# Patient Record
Sex: MALE
[De-identification: names, ages, dates, MRNs, and addresses within clinical notes are randomized per-mention and may not be internally consistent; named-entity substitution may affect disease eponyms.]

---

## 2017-05-04 ENCOUNTER — RX ONLY (RX ONLY)
Age: 22
End: 2017-05-04

## 2020-11-17 ENCOUNTER — OFFICE VISIT (OUTPATIENT)
Dept: SURGERY | Age: 25
End: 2020-11-17
Payer: COMMERCIAL

## 2020-11-17 ENCOUNTER — PREP FOR PROCEDURE (OUTPATIENT)
Dept: SURGERY | Age: 25
End: 2020-11-17

## 2020-11-17 VITALS
OXYGEN SATURATION: 96 % | TEMPERATURE: 97.3 F | SYSTOLIC BLOOD PRESSURE: 108 MMHG | DIASTOLIC BLOOD PRESSURE: 70 MMHG | HEART RATE: 89 BPM | WEIGHT: 139 LBS | HEIGHT: 72 IN | BODY MASS INDEX: 18.83 KG/M2

## 2020-11-17 PROCEDURE — 46600 DIAGNOSTIC ANOSCOPY SPX: CPT | Performed by: SURGERY

## 2020-11-17 PROCEDURE — 99203 OFFICE O/P NEW LOW 30 MIN: CPT | Performed by: SURGERY

## 2020-11-17 RX ORDER — SODIUM CHLORIDE 0.9 % (FLUSH) 0.9 %
10 SYRINGE (ML) INJECTION PRN
Status: CANCELLED | OUTPATIENT
Start: 2020-11-17

## 2020-11-17 RX ORDER — SODIUM CHLORIDE 0.9 % (FLUSH) 0.9 %
10 SYRINGE (ML) INJECTION EVERY 12 HOURS SCHEDULED
Status: CANCELLED | OUTPATIENT
Start: 2020-11-17

## 2020-11-17 RX ORDER — M-VIT,TX,IRON,MINS/CALC/FOLIC 27MG-0.4MG
1 TABLET ORAL DAILY
COMMUNITY
End: 2020-12-14

## 2020-11-17 SDOH — HEALTH STABILITY: MENTAL HEALTH: HOW OFTEN DO YOU HAVE A DRINK CONTAINING ALCOHOL?: NEVER

## 2020-11-17 NOTE — LETTER
P - [facility] [address] [place] [address] Road [phone]  f [phone]    Yadira Condon MD                        SURGERY ORDER   -- Time of order -- 20    9:59 AM    Facility:   [facility]. # _________________                                                                                    Scheduled By:____________                  Surgery Date & Time: 12-15-20 @7:15                                      Pt arrival: 5:30                                                                                      Patient Name:  Moses Enrique     :  1995     PCP:  No primary care provider on file. Home Ph:    [phone] (home)                                                     PROCEDURE:  Exam under anesthesia,  botox injection anal fissure, fissurectomy, flexible sigmoidoscopy   29283, 27246, 93385, 24260      DIAGNOSIS:      ICD-10-CM    1.  Anal fissure  K60.2        Anesthesia: _General    Anesthesia: lines, blocks, TAP/epidural, etc: none  Time Needed:  15 minutes    Pt Position:  prone/alcides-knife    Bowel Prep: flemaverick         Outpatient _x__ Admit ___                  Pre-Op to be done by: _N/A____    Cardiac Clearance Done by: ________    Medications to be stopped 5 days before surgery: _________    Additional / Special Orders:                                                                                                                                                                                                          Yadira Condon MD

## 2020-11-17 NOTE — PATIENT INSTRUCTIONS
Anal Fissure: Information and Care Instructions        An anal fissure is a tear in the lining of the anus. It typically causes intense, sharp pain and a small amount of bleeding. You may notice bright red blood on the toilet paper after you wipe. A fissure may form if you are constipated and try to pass a large, hard stool, or have excessive diarrhea. Some fissures form despite regular, soft stools. Some are due to trauma. Rarely, fissures can be a sign of other diseases such as Crohn's disease, infections, or cancer. In 50% of patients, anal fissure will heal by addressing the underlying problem and avoiding additional hard stool and constipation. See below for recommendations. In the other 50% of patients, anal fissures are resistant to healing due to spasm (abnormal tightening) of the internal sphincter muscle. This part of the anal muscles is under automatic control, so you may not feel the spasm. Most treatments attempt to break the cycle of spasm and pain by relaxing the internal sphincter muscle. This can be done with medication, Botox injection, and occasionally with surgery. Anal fissures themselves do not lead to cancer or other serious illnesses, however undiagnosed rectal bleeding can be a sign of other problems in the colon and rectum, such as hemorrhoids, infections, polyps, or even cancers. If bleeding is excessive, mixed with stool, or ongoing after healing of the fissure, or you have a family history of cancer, colonoscopy may be recommended. How to treat constipation and avoid straining:  · Avoid constipation:  ¨ Include fruits, vegetables, beans, and whole grains in your diet each day. These foods are high in fiber. ¨ Take a fiber supplement, such as Benefiber, Citrucel, or Metamucil, every day. Read and follow all instructions on the label. ¨ Drink plenty of fluids, enough so that your urine is light yellow or clear like water.  If you have kidney, heart, or liver disease and have to limit fluids, talk with your doctor before you increase the amount of fluids you drink. ¨ Miralax or colace can be helpful to take as needed for constipation. Read and follow all instructions on the label. ¨ Use the toilet when only when you feel the urge. Do not strain when having a bowel movement. Do not sit on the toilet too long, play games on your cell phone, or read while on the commode. ¨ Get some exercise every day. Build up slowly to 30 to 60 minutes a day on 5 or more days of the week. · Support your feet with a small step stool when you sit on the toilet. This helps flex your hips and places your pelvis in a squatting position. · Most over-the-counter ointments and creams are not helpful, and can even cause damage to the skin  · Use baby wipes instead of toilet paper to clean after a bowel movement. These products do not irritate the anus. · Sit in a few inches of warm water (sitz bath) 3 times a day and after bowel movements. The warm water helps ease discomfort. Do not put soaps, salts, or shampoos in the water. Be sure to follow up in the office as instructed  · Typically you will have a follow up appointment scheduled in 4-6 weeks to reassess your symptoms. If not, please call the office to schedule this. · You may need to be seen sooner if you have fever, chills, excessive bleeding, increasing pain, inability to urinate, or changes in appetite. · Please call the office at [phone] with any questions or concerns  · If it is outside of normal hours and you have any concerns, please go to your nearest emergency room. What other options are available to treat fissures if I continue to have symptoms:  · Special ointments can be prescribed to help relax the muscle spasm. Typically, these need to be compounded (mixed) at a special pharmacy. A pea-sized amount should be used around (not inside) the anus twice daily.   · Botox injections of the sphincter can be performed, which will provide spasm relief for 1-2 months. Occasionally this needs to be repeated. This is typically performed as a same day surgery with anesthesia/sedation. · Internal sphincterotomy is a surgery in which a portion of the internal sphincter muscle is cut, to relieve the spasm. This procedure has a high success rate, but a higher risk of complications, including rarely a loss of bowel control (incontinence). This is typically performed as a same day surgery with anesthesia/sedation. · Fissurectomy and dermal advancement flap is a surgery in which healthy skin flaps are brought in from around the anus to cover the fissure and promote healing. This surgery is a bit more complex and sometimes require an overnight stay in the hospital.  · The decision of which treatment is right for you depends on the chronicity and severity of your symptoms, age, gender, previous anorectal and other surgeries, underlying bowel control issues, and any suspicion for cancer or other diseases. · Colonoscopy may be recommended at some point in your care if you have not had one recently or bleeding continues after the fissure heals    · Please talk to your colorectal surgeon about any health conditions or concerns you may have regarding your anal fissure treatment.

## 2020-11-24 ENCOUNTER — TELEPHONE (OUTPATIENT)
Dept: SURGERY | Age: 25
End: 2020-11-24

## 2020-12-08 NOTE — PROGRESS NOTES
[facility] [employer] / [facility]) [address] [facility], [address]    Acknowledgment of Informed Consent for Surgical or Medical Procedure and Sedation  I agree to allow doctor(s) Theodore Hope and his/her associates or assistants, including residents and/or other qualified medical practitioner to perform the following medical treatment or procedure and to administer or direct the administration of sedation as necessary:  Procedure(s): EXAM UNDER ANESTHESIA, BOTOX INJECTION ANAL FISSURE, FISSURECTOMY, FLEXIBLE SIGMOIDOSCOPY  My doctor has explained the following regarding the proposed procedure:   the explanation of the procedure   the benefits of the procedure   the potential problems that might occur during recuperation   the risks and side effects of the procedure which could include but are not limited to severe blood loss, infection, stroke or death   the benefits, risks and side effect of alternative procedures including the consequences of declining this procedure or any alternative procedures   the likelihood of achieving satisfactory results. I acknowledge no guarantee or assurance has been made to me regarding the results. I understand that during the course of this treatment/procedure, unforeseen conditions can occur which require an additional or different procedure. I agree to allow my physician or assistants to perform such extension of the original procedure as they may find necessary. I understand that sedation will often result in temporary impairment of memory and fine motor skills and that sedation can occasionally progress to a state of deep sedation or general anesthesia. I understand the risks of anesthesia for surgery include, but are not limited to, sore throat, hoarseness, injury to face, mouth, or teeth; nausea; headache; injury to blood vessels or nerves; death, brain damage, or paralysis.     I understand that if I have a Limitation of Treatment order in effect during my hospitalization, the order may or may not be in effect during this procedure. I give my doctor permission to give me blood or blood products. I understand that there are risks with receiving blood such as hepatitis, AIDS, fever, or allergic reaction. I acknowledge that the risks, benefits, and alternatives of this treatment have been explained to me and that no express or implied warranty has been given by the hospital, any blood bank, or any person or entity as to the blood or blood components transfused. At the discretion of my doctor, I agree to allow observers, equipment/product representatives and allow photographing, and/or televising of the procedure, provided my name or identity is maintained confidentially. I agree the hospital may dispose of or use for scientific or educational purposes any tissue, fluid, or body parts which may be removed.     ________________________________Date________Time______ am/pm  ([facility])  Patient or Signature of Closest Relative or Legal Guardian    ________________________________Date________Time______am/pm      Page 1 of  1  Witness

## 2020-12-09 ENCOUNTER — OFFICE VISIT (OUTPATIENT)
Dept: PRIMARY CARE CLINIC | Age: 25
End: 2020-12-09
Payer: COMMERCIAL

## 2020-12-09 PROCEDURE — 99211 OFF/OP EST MAY X REQ PHY/QHP: CPT | Performed by: NURSE PRACTITIONER

## 2020-12-10 LAB — SARS-COV-2: NOT DETECTED

## 2020-12-14 ENCOUNTER — ANESTHESIA EVENT (OUTPATIENT)
Dept: OPERATING ROOM | Age: 25
End: 2020-12-14
Payer: COMMERCIAL

## 2020-12-14 ENCOUNTER — TELEPHONE (OUTPATIENT)
Dept: SURGERY | Age: 25
End: 2020-12-14

## 2020-12-14 NOTE — PROGRESS NOTES
Discharge instructions:  Patient plans to be discharged day of surgery to home with his mother Theodis Mcburney.

## 2020-12-14 NOTE — PROGRESS NOTES
[facility] patients having surgery or anesthesia are required to be Covid tested. You will need to quarantined from the time you are tested until your surgery. PRIOR TO PROCEDURE DATE:  1. Please follow any guidelines/instructions prior to your procedure as advised by your surgeon. 2. Arrange for someone to drive you home and be with you for the first 24 hours after discharge for your safety after your procedure for which you received sedation. Ensure it is someone we can share information with regarding your discharge. 3. You must contact your surgeon for instructions IF:   You are taking any blood thinners, aspirin, anti-inflammatory or vitamin E.   There is a change in your physical condition such as a cold, fever, rash, cuts, sores or any other infection, especially near your surgical site. 4. Do not drink alcohol the day before or day of your procedure. 5. A Pre-op History and Physical for surgery MUST be completed by your Physician or Urgent Care within 30 days of your procedure date. Please bring a copy with you on the day of your procedure and along with any other testing performed. THE DAY OF YOUR PROCEDURE:  1. Follow instructions for ARRIVAL TIME as DIRECTED BY YOUR SURGEON. I    2. Enter the MAIN entrance from [employer] and follow the signs to the free [employer] or [employer] parking (offered free of charge 6am-5pm). 3. Enter the Main Entrance of the hospital (do not enter from the lower level of the parking garage). Upon entrance, check in with the  at the main desk on your left. If no one is available at the desk, proceed into the [facility] Waiting Room and go through the door directly into the [facility]. There is a Check-in desk ACROSS from Room 5 (marked with a sign hanging from the ceiling). The phone number for the surgery center is [phone].     4. Please call [phone] option #2 option #2 if you have not been preregistered yet. On the day of your procedure bring your insurance card and photo ID. You will be registered at your bedside once brought back to your room. 5. DO NOT EAT ANYTHING eight hours prior to surgery. May have 8 ounces of water 4 hours prior to surgery. 6. MEDICATIONS    Take the following medications with a SMALL sip of water: none   Use your usual dose of inhalers the morning of surgery. BRING your rescue inhaler with you to hospital.    Anesthesia does NOT want you to take insulin the morning of surgery. They will control your blood sugar while you are at the hospital. Please contact your ordering physician for instructions regarding your insulin the night before your procedure. If you have an insulin pump, please keep it set on basal rate. 7. Do not swallow water when brushing teeth. No gum, candy, mints or ice chips. Refrain from smoking or at least decrease the amount. 8. Dress in loose, comfortable clothing appropriate for redressing after your procedure. Do not wear jewelry (including body piercings), make-up (especially NO eye make-up), fingernail polish (NO toenail polish if foot/leg surgery), lotion, powders or metal hairclips. 9. Dentures, glasses, or contacts will need to be removed before your procedure. Bring cases for your glasses, contacts, dentures, or hearing aids to protect them while you are in surgery. 10. If you use a CPAP, please bring it with you on the day of your procedure. 11. We recommend that valuable personal  belongings such as cash, cell phones, e-tablets or jewelry, be left at home during your stay. The hospital will not be responsible for valuables that are not secured in the hospital safe. However, if your insurance requires a co-pay, you may want to bring a method of payment, i.e. Check or credit card, if you wish to pay your co-pay the day of surgery.       12. If you are to stay overnight, you may bring a bag with personal items. Please have any large items you may need brought in by your family after your arrival to your hospital room. 15. If you have a Living Will or Durable Power of , please bring a copy on the day of your procedure. 15. With your permission, one family member may accompany you while you are being prepared for surgery. Once you are ready, additional family members may join you. HOW WE KEEP YOU SAFE and WORK TO PREVENT SURGICAL SITE INFECTIONS:  1. Health care workers should always check your ID bracelet to verify your name and birth date. You will be asked many times to state your name, date of birth, and allergies. 2. Health care workers should always clean their hands with soap or alcohol gel before providing care to you. It is okay to ask anyone if they cleaned their hands before they touch you. 3. You will be actively involved in verifying the type of procedure you are having and ensuring the correct surgical site. This will be confirmed multiple times prior to your procedure. Do NOT keena your surgery site UNLESS instructed to by your surgeon. 4. Do not shave or wax for 72 hours prior to procedure near your operative site. Shaving with a razor can irritate your skin and make it easier to develop an infection. On the day of your procedure, any hair that needs to be removed near the surgical site will be clipped by a healthcare worker using a special clippers designed to avoid skin irritation. 5. When you are in the operating room, your surgical site will be cleansed with a special soap, and in most cases, you will be given an antibiotic before the surgery begins. What to expect AFTER YOUR PROCEDURE:  1. Immediately following your procedure, your will be taken to the PACU for the first phase of your recovery.   Your nurse will help you recover from any potential side effects of anesthesia, such as extreme drowsiness, changes in your vital signs or breathing patterns. Nausea, headache, muscle aches, or sore throat may also occur after anesthesia. Your nurse will help you manage these potential side effects. 2. For comfort and safety, arrange to have someone at home with you for the first 24 hours after discharge. 3. You and your family will be given written instructions about your diet, activity, dressing care, medications, and return visits. 4. Once at home, should issues with nausea, pain, or bleeding occur, or should you notice any signs of infection, you should call your surgeon. 5. Always clean your hands before and after caring for your wound. Do not let your family touch your surgery site without cleaning their hands. 6. Narcotic pain medications can cause significant constipation. You may want to add a stool softener to your postoperative medication schedule or speak to your surgeon on how best to manage this SIDE EFFECT. SPECIAL INSTRUCTIONS advised to check with Dr. Lauren Andrews office to clarify arrival time. Informed patient of restricted visitation that is in place as of today. Patient acknowledges understanding of pre op instructions. Thank you for allowing us to care for you. We strive to exceed your expectations in the delivery of care and service provided to you and your family. If you need to contact us for any reason, please call us at [phone]    Instructions reviewed with patient during preadmission testing phone interview. Imelda Moore. 12/14/2020 .11:39 AM      ADDITIONAL EDUCATIONAL INFORMATION REVIEWED PER PHONE WITH YOU AND/OR YOUR FAMILY:  No Bring a urine sample on day of surgery  No Hibiclens® Bathing Instructions   Yes Antibacterial Soap

## 2020-12-15 ENCOUNTER — ANESTHESIA (OUTPATIENT)
Dept: OPERATING ROOM | Age: 25
End: 2020-12-15
Payer: COMMERCIAL

## 2020-12-15 ENCOUNTER — HOSPITAL ENCOUNTER (OUTPATIENT)
Age: 25
Setting detail: OUTPATIENT SURGERY
Discharge: HOME | End: 2020-12-15
Attending: SURGERY | Admitting: SURGERY
Payer: COMMERCIAL

## 2020-12-15 VITALS
BODY MASS INDEX: 18.96 KG/M2 | HEART RATE: 67 BPM | SYSTOLIC BLOOD PRESSURE: 121 MMHG | RESPIRATION RATE: 16 BRPM | HEIGHT: 72 IN | OXYGEN SATURATION: 97 % | TEMPERATURE: 98 F | WEIGHT: 140 LBS | DIASTOLIC BLOOD PRESSURE: 79 MMHG

## 2020-12-15 VITALS — SYSTOLIC BLOOD PRESSURE: 106 MMHG | DIASTOLIC BLOOD PRESSURE: 61 MMHG | OXYGEN SATURATION: 90 % | TEMPERATURE: 96.1 F

## 2020-12-15 PROCEDURE — 88304 TISSUE EXAM BY PATHOLOGIST: CPT

## 2020-12-15 PROCEDURE — 3700000000 HC ANESTHESIA ATTENDED CARE: Performed by: SURGERY

## 2020-12-15 PROCEDURE — 7100000010 HC PHASE II RECOVERY - FIRST 15 MIN: Performed by: SURGERY

## 2020-12-15 PROCEDURE — 3600000013 HC SURGERY LEVEL 3 ADDTL 15MIN: Performed by: SURGERY

## 2020-12-15 PROCEDURE — 2709999900 HC NON-CHARGEABLE SUPPLY: Performed by: SURGERY

## 2020-12-15 PROCEDURE — 2580000003 HC RX 258: Performed by: ANESTHESIOLOGY

## 2020-12-15 PROCEDURE — C9290 INJ, BUPIVACAINE LIPOSOME: HCPCS | Performed by: SURGERY

## 2020-12-15 PROCEDURE — 3700000001 HC ADD 15 MINUTES (ANESTHESIA): Performed by: SURGERY

## 2020-12-15 PROCEDURE — 46505 CHEMODENERVATION ANAL MUSC: CPT | Performed by: SURGERY

## 2020-12-15 PROCEDURE — 45330 DIAGNOSTIC SIGMOIDOSCOPY: CPT | Performed by: SURGERY

## 2020-12-15 PROCEDURE — 2580000003 HC RX 258: Performed by: SURGERY

## 2020-12-15 PROCEDURE — 2500000003 HC RX 250 WO HCPCS: Performed by: NURSE ANESTHETIST, CERTIFIED REGISTERED

## 2020-12-15 PROCEDURE — 7100000001 HC PACU RECOVERY - ADDTL 15 MIN: Performed by: SURGERY

## 2020-12-15 PROCEDURE — 3600000003 HC SURGERY LEVEL 3 BASE: Performed by: SURGERY

## 2020-12-15 PROCEDURE — 6360000002 HC RX W HCPCS: Performed by: NURSE ANESTHETIST, CERTIFIED REGISTERED

## 2020-12-15 PROCEDURE — 6360000002 HC RX W HCPCS: Performed by: SURGERY

## 2020-12-15 PROCEDURE — 46200 REMOVAL OF ANAL FISSURE: CPT | Performed by: SURGERY

## 2020-12-15 PROCEDURE — 7100000000 HC PACU RECOVERY - FIRST 15 MIN: Performed by: SURGERY

## 2020-12-15 PROCEDURE — 7100000011 HC PHASE II RECOVERY - ADDTL 15 MIN: Performed by: SURGERY

## 2020-12-15 RX ORDER — LABETALOL 20 MG/4 ML (5 MG/ML) INTRAVENOUS SYRINGE
5 EVERY 10 MIN PRN
Status: DISCONTINUED | OUTPATIENT
Start: 2020-12-15 | End: 2020-12-15 | Stop reason: HOSPADM

## 2020-12-15 RX ORDER — SODIUM CHLORIDE 0.9 % (FLUSH) 0.9 %
10 SYRINGE (ML) INJECTION EVERY 12 HOURS SCHEDULED
Status: DISCONTINUED | OUTPATIENT
Start: 2020-12-15 | End: 2020-12-15 | Stop reason: HOSPADM

## 2020-12-15 RX ORDER — ONDANSETRON 2 MG/ML
INJECTION INTRAMUSCULAR; INTRAVENOUS PRN
Status: DISCONTINUED | OUTPATIENT
Start: 2020-12-15 | End: 2020-12-15 | Stop reason: SDUPTHER

## 2020-12-15 RX ORDER — PROMETHAZINE HYDROCHLORIDE 25 MG/ML
6.25 INJECTION, SOLUTION INTRAMUSCULAR; INTRAVENOUS
Status: DISCONTINUED | OUTPATIENT
Start: 2020-12-15 | End: 2020-12-15 | Stop reason: HOSPADM

## 2020-12-15 RX ORDER — HYDRALAZINE HYDROCHLORIDE 20 MG/ML
5 INJECTION INTRAMUSCULAR; INTRAVENOUS EVERY 10 MIN PRN
Status: DISCONTINUED | OUTPATIENT
Start: 2020-12-15 | End: 2020-12-15 | Stop reason: HOSPADM

## 2020-12-15 RX ORDER — PROPOFOL 10 MG/ML
INJECTION, EMULSION INTRAVENOUS PRN
Status: DISCONTINUED | OUTPATIENT
Start: 2020-12-15 | End: 2020-12-15 | Stop reason: SDUPTHER

## 2020-12-15 RX ORDER — MIDAZOLAM HYDROCHLORIDE 1 MG/ML
INJECTION INTRAMUSCULAR; INTRAVENOUS PRN
Status: DISCONTINUED | OUTPATIENT
Start: 2020-12-15 | End: 2020-12-15 | Stop reason: SDUPTHER

## 2020-12-15 RX ORDER — ONDANSETRON 2 MG/ML
4 INJECTION INTRAMUSCULAR; INTRAVENOUS
Status: DISCONTINUED | OUTPATIENT
Start: 2020-12-15 | End: 2020-12-15 | Stop reason: HOSPADM

## 2020-12-15 RX ORDER — SODIUM CHLORIDE 9 MG/ML
INJECTION, SOLUTION INTRAVENOUS CONTINUOUS
Status: DISCONTINUED | OUTPATIENT
Start: 2020-12-15 | End: 2020-12-15 | Stop reason: HOSPADM

## 2020-12-15 RX ORDER — OXYCODONE HYDROCHLORIDE 5 MG/1
5 TABLET ORAL EVERY 6 HOURS PRN
Qty: 20 TABLET | Refills: 0 | Status: SHIPPED | OUTPATIENT
Start: 2020-12-15 | End: 2020-12-20

## 2020-12-15 RX ORDER — SODIUM CHLORIDE 0.9 % (FLUSH) 0.9 %
10 SYRINGE (ML) INJECTION PRN
Status: DISCONTINUED | OUTPATIENT
Start: 2020-12-15 | End: 2020-12-15 | Stop reason: HOSPADM

## 2020-12-15 RX ORDER — ROCURONIUM BROMIDE 10 MG/ML
INJECTION, SOLUTION INTRAVENOUS PRN
Status: DISCONTINUED | OUTPATIENT
Start: 2020-12-15 | End: 2020-12-15 | Stop reason: SDUPTHER

## 2020-12-15 RX ORDER — SODIUM CHLORIDE, SODIUM LACTATE, POTASSIUM CHLORIDE, CALCIUM CHLORIDE 600; 310; 30; 20 MG/100ML; MG/100ML; MG/100ML; MG/100ML
INJECTION, SOLUTION INTRAVENOUS CONTINUOUS
Status: DISCONTINUED | OUTPATIENT
Start: 2020-12-15 | End: 2020-12-15 | Stop reason: HOSPADM

## 2020-12-15 RX ORDER — MAGNESIUM HYDROXIDE 1200 MG/15ML
LIQUID ORAL CONTINUOUS PRN
Status: COMPLETED | OUTPATIENT
Start: 2020-12-15 | End: 2020-12-15

## 2020-12-15 RX ORDER — MEPERIDINE HYDROCHLORIDE 25 MG/ML
12.5 INJECTION INTRAMUSCULAR; INTRAVENOUS; SUBCUTANEOUS EVERY 5 MIN PRN
Status: DISCONTINUED | OUTPATIENT
Start: 2020-12-15 | End: 2020-12-15 | Stop reason: HOSPADM

## 2020-12-15 RX ORDER — LIDOCAINE HYDROCHLORIDE 20 MG/ML
INJECTION, SOLUTION INFILTRATION; PERINEURAL PRN
Status: DISCONTINUED | OUTPATIENT
Start: 2020-12-15 | End: 2020-12-15 | Stop reason: SDUPTHER

## 2020-12-15 RX ORDER — FENTANYL CITRATE 50 UG/ML
INJECTION, SOLUTION INTRAMUSCULAR; INTRAVENOUS PRN
Status: DISCONTINUED | OUTPATIENT
Start: 2020-12-15 | End: 2020-12-15 | Stop reason: SDUPTHER

## 2020-12-15 RX ORDER — FENTANYL CITRATE 50 UG/ML
50 INJECTION, SOLUTION INTRAMUSCULAR; INTRAVENOUS EVERY 5 MIN PRN
Status: DISCONTINUED | OUTPATIENT
Start: 2020-12-15 | End: 2020-12-15 | Stop reason: HOSPADM

## 2020-12-15 RX ORDER — FENTANYL CITRATE 50 UG/ML
25 INJECTION, SOLUTION INTRAMUSCULAR; INTRAVENOUS EVERY 5 MIN PRN
Status: DISCONTINUED | OUTPATIENT
Start: 2020-12-15 | End: 2020-12-15 | Stop reason: HOSPADM

## 2020-12-15 RX ORDER — OXYCODONE HYDROCHLORIDE AND ACETAMINOPHEN 5; 325 MG/1; MG/1
1 TABLET ORAL
Status: DISCONTINUED | OUTPATIENT
Start: 2020-12-15 | End: 2020-12-15 | Stop reason: HOSPADM

## 2020-12-15 RX ADMIN — SUGAMMADEX 200 MG: 100 INJECTION, SOLUTION INTRAVENOUS at 07:49

## 2020-12-15 RX ADMIN — PROPOFOL 200 MG: 10 INJECTION, EMULSION INTRAVENOUS at 07:17

## 2020-12-15 RX ADMIN — ONDANSETRON 2 MG: 2 INJECTION INTRAMUSCULAR; INTRAVENOUS at 07:17

## 2020-12-15 RX ADMIN — LIDOCAINE HYDROCHLORIDE 100 MG: 20 INJECTION, SOLUTION INFILTRATION; PERINEURAL at 07:17

## 2020-12-15 RX ADMIN — SODIUM CHLORIDE, POTASSIUM CHLORIDE, SODIUM LACTATE AND CALCIUM CHLORIDE: 600; 310; 30; 20 INJECTION, SOLUTION INTRAVENOUS at 07:10

## 2020-12-15 RX ADMIN — MIDAZOLAM HYDROCHLORIDE 2 MG: 2 INJECTION, SOLUTION INTRAMUSCULAR; INTRAVENOUS at 07:12

## 2020-12-15 RX ADMIN — ROCURONIUM BROMIDE 50 MG: 10 INJECTION INTRAVENOUS at 07:17

## 2020-12-15 RX ADMIN — FENTANYL CITRATE 100 MCG: 50 INJECTION INTRAMUSCULAR; INTRAVENOUS at 07:17

## 2020-12-15 ASSESSMENT — PULMONARY FUNCTION TESTS
PIF_VALUE: 15
PIF_VALUE: 12
PIF_VALUE: 17
PIF_VALUE: 0
PIF_VALUE: 30
PIF_VALUE: 31
PIF_VALUE: 17
PIF_VALUE: 1
PIF_VALUE: 16
PIF_VALUE: 17
PIF_VALUE: 17
PIF_VALUE: 0
PIF_VALUE: 16
PIF_VALUE: 15
PIF_VALUE: 6
PIF_VALUE: 17
PIF_VALUE: 13
PIF_VALUE: 16
PIF_VALUE: 14
PIF_VALUE: 17
PIF_VALUE: 0
PIF_VALUE: 17
PIF_VALUE: 14
PIF_VALUE: 2
PIF_VALUE: 1
PIF_VALUE: 17
PIF_VALUE: 2
PIF_VALUE: 22
PIF_VALUE: 17
PIF_VALUE: 17
PIF_VALUE: 20
PIF_VALUE: 17
PIF_VALUE: 1
PIF_VALUE: 16
PIF_VALUE: 0
PIF_VALUE: 17
PIF_VALUE: 5
PIF_VALUE: 6
PIF_VALUE: 17
PIF_VALUE: 17
PIF_VALUE: 16
PIF_VALUE: 17
PIF_VALUE: 15
PIF_VALUE: 3
PIF_VALUE: 17
PIF_VALUE: 24
PIF_VALUE: 16
PIF_VALUE: 0
PIF_VALUE: 1
PIF_VALUE: 14
PIF_VALUE: 6
PIF_VALUE: 17
PIF_VALUE: 5
PIF_VALUE: 17
PIF_VALUE: 0
PIF_VALUE: 17
PIF_VALUE: 17
PIF_VALUE: 1
PIF_VALUE: 3

## 2020-12-15 ASSESSMENT — PAIN - FUNCTIONAL ASSESSMENT: PAIN_FUNCTIONAL_ASSESSMENT: 0-10

## 2020-12-15 ASSESSMENT — PAIN SCALES - GENERAL
PAINLEVEL_OUTOF10: 0

## 2020-12-15 NOTE — BRIEF OP NOTE
Brief Postoperative Note      Patient: Alber Deluna  YOB: 1995  MRN: [id number]    Date of Procedure: 12/15/2020    Pre-Op Diagnosis: Anal fissure [K60.2]    Post-Op Diagnosis: Same       Procedure(s):  EXAM UNDER ANESTHESIA, BOTOX INJECTION ANAL FISSURE, FISSURECTOMY, FLEXIBLE SIGMOIDOSCOPY    Surgeon(s):  Saba Crow MD    Assistant:  Resident: Angelic Muñoz MD    Anesthesia: General    Estimated Blood Loss (mL): less than 50     Complications: None    Specimens:   ID Type Source Tests Collected by Time Destination   A : FISSURE Tissue Tissue SURGICAL PATHOLOGY Saba Crow MD 12/15/2020 7946        Implants:  * No implants in log *      Drains: * No LDAs found *    Findings: anterior fissure, 100 units of botox injected, no abnormalities noted on flex sig    Electronically signed by Angelic Muñoz MD on 12/15/2020 at 7:51 AM

## 2020-12-15 NOTE — ANESTHESIA PRE PROCEDURE
Department of Anesthesiology  Preprocedure Note       Name:  Rachel Barrera   Age:  26 y. o.  :  1995                                          MRN:  [id number]         Date:  12/15/2020      Surgeon: Jelena Earl):  Kenyetta Arias MD    Procedure: Procedure(s):  EXAM UNDER ANESTHESIA, BOTOX INJECTION ANAL FISSURE, FISSURECTOMY, FLEXIBLE SIGMOIDOSCOPY  . Medications prior to admission:   Prior to Admission medications    Medication Sig Start Date End Date Taking? Authorizing Provider   MULTIPLE VITAMINS-MINERALS ER PO Take 1 tablet by mouth daily    Yes Historical Provider, MD       Current medications:    Current Facility-Administered Medications   Medication Dose Route Frequency Provider Last Rate Last Admin    sodium chloride flush 0.9 % injection 10 mL  10 mL Intravenous 2 times per day Destiny Snide, DO        sodium chloride flush 0.9 % injection 10 mL  10 mL Intravenous PRN Destiny Snide, DO        0.9 % sodium chloride infusion   Intravenous Continuous Destiny Snide, DO        lactated ringers infusion   Intravenous Continuous Destiny Snide, DO        sodium chloride flush 0.9 % injection 10 mL  10 mL Intravenous 2 times per day Kenyetta Arias MD        sodium chloride flush 0.9 % injection 10 mL  10 mL Intravenous PRN Kenyetta Arias MD           Allergies: Allergies   Allergen Reactions    Cephalosporins      Fever      Penicillins Other (See Comments)     High fever       Problem List:  There is no problem list on file for this patient. Past Medical History:  History reviewed. No pertinent past medical history.     Past Surgical History:        Procedure Laterality Date    TONSILLECTOMY         Social History:    Social History     Tobacco Use    Smoking status: Never Smoker    Smokeless tobacco: Never Used   Substance Use Topics    Alcohol use: Never     Frequency: Never                                Counseling given: Not Answered      Vital Signs (Current):   Vitals: 12/14/20 1123 12/15/20 0630   BP:  106/68   Pulse:  65   Resp:  18   Temp:  97.5 °F (36.4 °C)   TempSrc:  Oral   SpO2:  100%   Weight: 139 lb (63 kg) 140 lb (63.5 kg)   Height: 6' (1.829 m) 6' (1.829 m)                                              BP Readings from Last 3 Encounters:   12/15/20 106/68   11/17/20 108/70       NPO Status: Time of last liquid consumption: 2330                        Time of last solid consumption: 1800                        Date of last liquid consumption: 12/14/20                        Date of last solid food consumption: 12/14/20    BMI:   Wt Readings from Last 3 Encounters:   12/15/20 140 lb (63.5 kg)   11/17/20 139 lb (63 kg)     Body mass index is 18.99 kg/m². CBC: No results found for: WBC, RBC, HGB, HCT, MCV, RDW, PLT    CMP: No results found for: NA, K, CL, CO2, BUN, CREATININE, GFRAA, AGRATIO, LABGLOM, GLUCOSE, PROT, CALCIUM, BILITOT, ALKPHOS, AST, ALT    POC Tests: No results for input(s): POCGLU, POCNA, POCK, POCCL, POCBUN, POCHEMO, POCHCT in the last 72 hours.     Coags: No results found for: PROTIME, INR, APTT    HCG (If Applicable): No results found for: PREGTESTUR, PREGSERUM, HCG, HCGQUANT     ABGs: No results found for: PHART, PO2ART, KKE6ONL, NGZ7RBN, BEART, O3UPSQRC     Type & Screen (If Applicable):  No results found for: LABABO, LABRH    Drug/Infectious Status (If Applicable):  No results found for: HIV, HEPCAB    COVID-19 Screening (If Applicable):   Lab Results   Component Value Date    COVID19 Not Detected 12/09/2020         Anesthesia Evaluation  Patient summary reviewed and Nursing notes reviewed no history of anesthetic complications:   Airway: Mallampati: I  TM distance: >3 FB   Neck ROM: full  Mouth opening: > = 3 FB Dental: normal exam         Pulmonary:Negative Pulmonary ROS                              Cardiovascular:Negative CV ROS            Rhythm: regular  Rate: normal                    Neuro/Psych:   Negative Neuro/Psych ROS GI/Hepatic/Renal:            ROS comment: Anal fissure. Endo/Other: Negative Endo/Other ROS                    Abdominal:           Vascular: negative vascular ROS. Anesthesia Plan      general     ASA 2       Induction: intravenous. MIPS: Prophylactic antiemetics administered. Anesthetic plan and risks discussed with patient. Plan discussed with CRNA.                   Dedra Bourne DO   12/15/2020

## 2020-12-15 NOTE — PROGRESS NOTES
0905pt to SDS awake, resting quietly in bed.  0920 VSS, pt denies pain, dressing to rectal area clean dry and intact. IV removed. 6584 pt waiting on brother to pick him up, mom had to leave. Pt eating alonzo crackers with peanut butter, no c/o given. 4374 pt to car per w/c, home per brother. No problems noted. Ambulatory Surgery/Procedure Discharge Note    Vitals:    12/15/20 0922   BP: 121/79   Pulse: 67   Resp: 16   Temp: 98 °F (36.7 °C)   SpO2: 97%       In: 950 [P.O.:50; I.V.:900]  Out: 125 [Urine:125]    Restroom use offered before discharge. Yes    Pain assessment:  none  Pain Level: 0        Patient discharged to home/self care.  Patient discharged via wheel chair by transporter to waiting family/S.O.       12/15/2020 10:31 AM

## 2020-12-15 NOTE — ANESTHESIA POSTPROCEDURE EVALUATION
Department of Anesthesiology  Postprocedure Note    Patient: Rashmi Singh  MRN: [id number]  YOB: 1995  Date of evaluation: 12/15/2020  Time:  9:42 AM     Procedure Summary     Date: 12/15/20 Room / Location: [address]    Anesthesia Start: 4169 Anesthesia Stop: 3135    Procedure: EXAM UNDER ANESTHESIA, BOTOX INJECTION ANAL FISSURE, FISSURECTOMY, FLEXIBLE SIGMOIDOSCOPY (N/A Buttocks) Diagnosis:       Anal fissure      (Anal fissure [K60.2])    Surgeons: David Pete MD Responsible Provider: Yunier Bernabe DO    Anesthesia Type: general ASA Status: 2          Anesthesia Type: general    Pj Phase I: Pj Score: 10    Pj Phase II: Pj Score: 10    Last vitals: Reviewed and per EMR flowsheets.        Anesthesia Post Evaluation    Patient location during evaluation: PACU  Patient participation: complete - patient participated  Level of consciousness: awake and alert  Airway patency: patent  Nausea & Vomiting: no nausea and no vomiting  Cardiovascular status: blood pressure returned to baseline  Respiratory status: acceptable  Hydration status: euvolemic

## 2020-12-15 NOTE — H&P
[facility] Update H & P  Department of General Surgery   Surgical Service   Pre-operative History and Physical  Last H & P within the last 30 days. DIAGNOSIS:   Anal fissure [K60.2]    Procedure(s):  EXAM UNDER ANESTHESIA, BOTOX INJECTION ANAL FISSURE, FISSURECTOMY, FLEXIBLE SIGMOIDOSCOPY  . HISTORY OF PRESENT ILLNESS:   Patient with 3 year history of bleeding and anorectal pain presents today for the above procedure. Covid 19:  Patient denies fever, chills, cough or known exposure to Covid-19. Past Medical History:    History reviewed. No pertinent past medical history.   Past Surgical History:        Procedure Laterality Date    TONSILLECTOMY       Past Social History:  Social History     Socioeconomic History    Marital status: Single     Spouse name: None    Number of children: None    Years of education: None    Highest education level: None   Occupational History    None   Social Needs    Financial resource strain: None    Food insecurity     Worry: None     Inability: None    Transportation needs     Medical: None     Non-medical: None   Tobacco Use    Smoking status: Never Smoker    Smokeless tobacco: Never Used   Substance and Sexual Activity    Alcohol use: Never     Frequency: Never    Drug use: Never    Sexual activity: None   Lifestyle    Physical activity     Days per week: None     Minutes per session: None    Stress: None   Relationships    Social connections     Talks on phone: None     Gets together: None     Attends [employer]ism service: None     Active member of club or organization: None     Attends meetings of clubs or organizations: None     Relationship status: None    Intimate partner violence     Fear of current or ex partner: None     Emotionally abused: None     Physically abused: None     Forced sexual activity: None   Other Topics Concern    None   Social History Narrative    None Medications Prior to Admission:      None    Allergies:  Cephalosporins and Penicillins    PHYSICAL EXAM:      /68   Pulse 65   Temp 97.5 °F (36.4 °C) (Oral)   Resp 18   Ht 6' (1.829 m)   Wt 140 lb (63.5 kg)   SpO2 100%   BMI 18.99 kg/m²      Airway:  Airway patent with no audible stridor    Heart:  regular rate and rhythm, No murmur noted    Lungs:  No increased work of breathing, good air exchange, clear to auscultation bilaterally, no crackles or wheezing    Abdomen:  soft, non-distended, non-tender, no rebound tenderness or guarding, normal active bowel sounds and no masses palpated    ASSESSMENT AND PLAN     Patient is a 26 y.o. male with above specified procedure planned. 1.  Patient seen and focused exam done today- no new changes since last physical exam on 11/17/20    2. Access to ancillary services are available per request of the provider.     Jim Hawkins, APRN - CNP     12/15/2020

## 2020-12-15 NOTE — OP NOTE
OPERATIVE NOTE     Sang Marker  1995  [id number]    DATE OF PROCEDURE: 12/15/20     PREOPERATIVE DIAGNOSES: Chronic anterior anal fissure     POSTOPERATIVE DIAGNOSES: Chronic anterior anal fissure     PROCEDURE:   1. Chemodenervation of internal sphincter with botulinum toxin for anal fissure  2. Fissurectomy  3. Flexible sigmoidoscopy. SURGEON: MD Ken Bishop, PGY-5, resident     ANESTHESIA: GET     COMPLICATIONS: None. EBL: 5 cc    SPECIMEN: Rectal biopsy/anal fissure     FINDINGS: Flexible sigmoidoscopy up to 15 cm. Normal. Anterior chronic anal fissure. INDICATIONS: The patient is a 50-year-old male who has had symptoms of chronic anal fissure unresponsive to medical management . Evaluation revealed chronic anterior anal fissure. Patient was recommended to undergo botox denervation of internal sphincter, fissurectomy vs biopsy, flexible sigmoidoscopy, and other associated procedures. The risks, benefits, and alternatives of procedure were explained to the patient including risks of bleeding, infection, pelvic sepsis, urinary retention, anal stenosis, and potential  sphincter damage and dysfunction. Patient understood all of these risks and agreed to  proceed. Typical postoperative course was discussed, including pain and likely need for up to 3 weeks of recovery. PROCEDURE DETAILS:   The patient was brought to the operating theater. The patient was placed in the prone alcides-knife position and sedation was started. Buttocks were taped apart carefully using tape. Flexible sigmoidoscopy was performed up to 15 cm. The mucosa was visualized  with air insufflation. Findings include: as above. The patient's perianal region was prepped and draped in usual sterile fashion using Betadine prep solution. Time-out was performed confirming the patient's identity as well as the operative site. Antibiotics were not indicated. SCDs were on and functioning.  All safety points were followed. Digital rectal exam and anoscopy was performed in all 4 quadrants using lighted anal retractor, findings as above. Cautery was used to excise and clean up edges of fissure, essentially performing fissurectomy. Specimen will be sent for path. 100 units total of botulinum toxin was injected on either side of the fissure into the internal sphincter, as well as into the base of the fissure. Anoscopy was then performed again, wounds were irrigated, confirming complete hemostasis. 20 cc of Expirel anesthetic was injected for perianal nerve block. The patient tolerated the procedure well, was woken up and brought to the PACU in stable condition. All counts were correct x2 at the end of the procedure. No complications. Rober Castillo.  Pallavi Thompson MD

## 2020-12-18 ENCOUNTER — TELEPHONE (OUTPATIENT)
Dept: SURGERY | Age: 25
End: 2020-12-18

## 2020-12-18 NOTE — TELEPHONE ENCOUNTER
Maybe related to intubation. He should talk to one of the anesthesia providers - lets get him a phone #.

## 2020-12-18 NOTE — TELEPHONE ENCOUNTER
Patient states that he has been coughing up blood since his surgery on 12/15/20.  His pain is 4/10 and would like to know if he needs to see a physician regarding this issue    Please contact the patient at [phone]

## 2020-12-18 NOTE — TELEPHONE ENCOUNTER
Spoke with Dr. Mariama Daniel in anesthesia who advised that issues should resolve and if not patient to follow up with PCP. Patient made aware. Patient also complained of dryness, advised to drink a lot of water and use throat lozenge.

## 2021-01-26 ENCOUNTER — OFFICE VISIT (OUTPATIENT)
Dept: SURGERY | Age: 26
End: 2021-01-26

## 2021-01-26 ENCOUNTER — TELEPHONE (OUTPATIENT)
Dept: SURGERY | Age: 26
End: 2021-01-26

## 2021-01-26 VITALS
HEIGHT: 72 IN | BODY MASS INDEX: 19.64 KG/M2 | HEART RATE: 105 BPM | OXYGEN SATURATION: 99 % | TEMPERATURE: 97.3 F | WEIGHT: 145 LBS | DIASTOLIC BLOOD PRESSURE: 67 MMHG | SYSTOLIC BLOOD PRESSURE: 113 MMHG

## 2021-01-26 DIAGNOSIS — K60.2 ANAL FISSURE: Primary | ICD-10-CM

## 2021-01-26 PROCEDURE — 99024 POSTOP FOLLOW-UP VISIT: CPT | Performed by: SURGERY

## 2021-06-10 ENCOUNTER — OFFICE VISIT (OUTPATIENT)
Dept: SURGERY | Age: 26
End: 2021-06-10
Payer: COMMERCIAL

## 2021-06-10 ENCOUNTER — PREP FOR PROCEDURE (OUTPATIENT)
Dept: SURGERY | Age: 26
End: 2021-06-10

## 2021-06-10 VITALS
OXYGEN SATURATION: 97 % | DIASTOLIC BLOOD PRESSURE: 87 MMHG | SYSTOLIC BLOOD PRESSURE: 149 MMHG | RESPIRATION RATE: 18 BRPM | HEART RATE: 98 BPM | TEMPERATURE: 97.2 F

## 2021-06-10 DIAGNOSIS — K60.2 ANAL FISSURE: Primary | ICD-10-CM

## 2021-06-10 PROCEDURE — 99214 OFFICE O/P EST MOD 30 MIN: CPT | Performed by: SURGERY

## 2021-06-10 RX ORDER — PROMETHAZINE HYDROCHLORIDE 25 MG/1
SUPPOSITORY RECTAL
COMMUNITY
Start: 2021-05-26 | End: 2021-07-12

## 2021-06-10 RX ORDER — OMEPRAZOLE 20 MG/1
20 CAPSULE, DELAYED RELEASE ORAL DAILY
COMMUNITY
End: 2021-07-12

## 2021-06-10 RX ORDER — SODIUM CHLORIDE 0.9 % (FLUSH) 0.9 %
10 SYRINGE (ML) INJECTION PRN
Status: CANCELLED | OUTPATIENT
Start: 2021-06-10

## 2021-06-10 RX ORDER — NORTRIPTYLINE HYDROCHLORIDE 10 MG/1
CAPSULE ORAL
COMMUNITY
Start: 2021-06-09 | End: 2021-07-12

## 2021-06-10 RX ORDER — ONDANSETRON 4 MG/1
TABLET, ORALLY DISINTEGRATING ORAL
COMMUNITY
Start: 2021-05-26 | End: 2021-07-12

## 2021-06-10 RX ORDER — BUSPIRONE HYDROCHLORIDE 5 MG/1
TABLET ORAL
COMMUNITY
Start: 2021-06-09 | End: 2021-07-12

## 2021-06-10 RX ORDER — SODIUM CHLORIDE 9 MG/ML
25 INJECTION, SOLUTION INTRAVENOUS PRN
Status: CANCELLED | OUTPATIENT
Start: 2021-06-10

## 2021-06-10 RX ORDER — FAMOTIDINE 40 MG/1
40 TABLET, FILM COATED ORAL 2 TIMES DAILY PRN
COMMUNITY
Start: 2021-05-26 | End: 2021-07-12

## 2021-06-10 RX ORDER — SODIUM CHLORIDE 0.9 % (FLUSH) 0.9 %
10 SYRINGE (ML) INJECTION EVERY 12 HOURS SCHEDULED
Status: CANCELLED | OUTPATIENT
Start: 2021-06-10

## 2021-06-10 NOTE — PROGRESS NOTES
[address] [facility]  [address] [facility]. [address]  [address] [place] [address]  Dept: [phone]  Dept Fax: [phone]: [phone]    Visit Date: 6/10/2021    Michela Cohen is a 26 y.o. male who presents today for: Post-Op Check (surgery 12/15/20)      HPI:       Michela Cohen is a 26 y.o. male known to me after examination under anesthesia, fissurectomy, Botox injection in January of this year. He had initially done quite well after surgery and had healed his fissure, but he tells me about 1 to 2 weeks ago he had a opening up of his fissure once again. Now again having pain and bleeding per rectum. No past medical history on file.   Past Surgical History:   Procedure Laterality Date    ANUS SURGERY N/A 12/15/2020    EXAM UNDER ANESTHESIA, BOTOX INJECTION ANAL FISSURE, FISSURECTOMY, FLEXIBLE SIGMOIDOSCOPY performed by Juanito Gill MD at [address]         Current Outpatient Medications:     busPIRone (BUSPAR) 5 MG tablet, , Disp: , Rfl:     famotidine (PEPCID) 40 MG tablet, Take 40 mg by mouth 2 times daily as needed, Disp: , Rfl:     nortriptyline (PAMELOR) 10 MG capsule, , Disp: , Rfl:     omeprazole (PRILOSEC) 20 MG delayed release capsule, Take 20 mg by mouth daily, Disp: , Rfl:     ondansetron (ZOFRAN-ODT) 4 MG disintegrating tablet, , Disp: , Rfl:     promethazine (PHENERGAN) 25 MG suppository, , Disp: , Rfl:     MULTIPLE VITAMINS-MINERALS ER PO, Take 1 tablet by mouth daily , Disp: , Rfl:   Allergies   Allergen Reactions    Cephalosporins      Fever      Clindamycin Other (See Comments)     Other reaction(s): Unknown    Penicillins Other (See Comments)     High fever     Past Surgical History:   Procedure Laterality Date    ANUS SURGERY N/A 12/15/2020    EXAM UNDER ANESTHESIA, BOTOX INJECTION ANAL FISSURE, FISSURECTOMY, FLEXIBLE SIGMOIDOSCOPY performed by Juanito Gill MD at [address] TONSILLECTOMY       No family history on file. Social History:   Social History     Tobacco Use    Smoking status: Never Smoker    Smokeless tobacco: Never Used   Substance Use Topics    Alcohol use: Never      Tobacco cessation counseling provided as appropriate. REVIEW OF SYSTEMS:    Pertinent positives and negatives are mentioned in the HPI. Otherwise, all other systems were reviewed and negative. Objective:     Physical Exam   BP (!) 149/87   Pulse 98   Temp 97.2 °F (36.2 °C)   Resp 18   SpO2 97%   Constitutional: Appears well-developed and well-nourished. Grooming appropriate. No gross deformities. There is no height or weight on file to calculate BMI. Eyes: No scleral icterus. Conjunctiva/lids normal. Vision intact grossly. Pupils equal/symmetric, reactive bilaterally. ENT: External ears/nose without defect, scars, or masses. Hearing grossly intact. No facial deformity. Lips normal, normal dentition. Neck: No masses. Trachea midline. No crepitus. Thyroid not enlarged. Cardiovascular: Normal rate. No peripheral edema. Abdominal aorta normal size to palpation. Pulmonary/Chest: Effort normal. No respiratory distress. No wheezes. No use of accessory muscles. Musculoskeletal: Normal range of motion of head/neck, without deformity, pain, or crepitus, with normal strength and tone. Normal gait. Nails without clubbing or cyanosis. Neurological: Alert and oriented to person, place, and time. No gross deficits. Sensation intact. Skin: Skin is dry. No rashes noted. No pallor. No induration of nodules. Psychiatric: Normal mood and affect. Behavior normal. Oriented to person, place, and time. Judgment and insight reasonable. Abdominal/wound: Soft, nontender, nondistended    RECTAL EXAM:    Chaperone/MA present in room during entire exam. Patient was placed in knee-chest position or left side down lateral position depending on preference.  Exam table manipulated for proper visualization and lighting. Buttocks spread. Inspection reveals: Anterior midline anal fissure, fairly mild appearing    Digital exam and anoscopy deferred due to acute pain      Labs reviewed: none     Imaging reviewed: none    Assessment/Plan:       A/P:  Established problem(s): Chronic versus recurrent anal fissure  Additional workup/treatment planned: EUA, lateral internal sphincterotomy  Risk of complications/morbidity: Moderate    Unfortunately, after initially having some success after Botox injection for his anal fissure, Giana Lu has recurred his symptoms over the past week. He is now interested in more definitive management with partial lateral internal sphincterotomy. I think this is reasonable considering that he now has had fissure for several years and has failed Botox injection. I did very carefully and specifically discussed with him the risks of incontinence with internal sphincterotomy, but it should be relatively low in his situation. He did tell me that he is having upper endoscopy next week in [facility] to further evaluate his upper GI symptoms and heartburn. We discussed that this may interfere with his surgical timing depending on the findings, and if specific medications are prescribed. He will call me next week with the results of this. For now, continue with current prescription medications      DISPOSITION:  Call next wk to schedule surgery    My findings will be relayed to consulting practitioner or PCP via Epic note    Note completed using dictation software, please excuse any errors.     Electronically signed by Pineda Koo MD on 6/10/2021 at 10:06 AM

## 2021-06-30 ENCOUNTER — TELEPHONE (OUTPATIENT)
Dept: SURGERY | Age: 26
End: 2021-06-30

## 2021-06-30 NOTE — TELEPHONE ENCOUNTER
Patient has been scheduled for:    Procedure:Exam under anesthesia, fissurectomy, partial lateral internal sphincterotomy     Date:7/19/21  Time:10:15AM  Arrival:8:15AM  Hospital:[facility]: Second vaccine 6/8/21  ASA?: N/A  Prep? NPO, Fleeets enema 2 hours before procedure    Patient advised they will need a . Orders faxed to surgery scheduling. Instructions have been emailed to [email or web address]

## 2021-07-12 NOTE — PROGRESS NOTES
[facility] / [facility] ([facility]) [address] [facility], [address]    Acknowledgment of Informed Consent for Surgical or Medical Procedure and Sedation  I agree to allow doctor(s) Abhilash Villalpando and his/her associates or assistants, including residents and/or other qualified medical practitioner to perform the following medical treatment or procedure and to administer or direct the administration of sedation as necessary:  Procedure(s): EXAM UNDER ANESTHESIA, FISSURECTOMY, PARTIAL LATERAL INTERNAL SPHINCTEROTOMY  My doctor has explained the following regarding the proposed procedure:   the explanation of the procedure   the benefits of the procedure   the potential problems that might occur during recuperation   the risks and side effects of the procedure which could include but are not limited to severe blood loss, infection, stroke or death   the benefits, risks and side effect of alternative procedures including the consequences of declining this procedure or any alternative procedures   the likelihood of achieving satisfactory results. I acknowledge no guarantee or assurance has been made to me regarding the results. I understand that during the course of this treatment/procedure, unforeseen conditions can occur which require an additional or different procedure. I agree to allow my physician or assistants to perform such extension of the original procedure as they may find necessary. I understand that sedation will often result in temporary impairment of memory and fine motor skills and that sedation can occasionally progress to a state of deep sedation or general anesthesia. I understand the risks of anesthesia for surgery include, but are not limited to, sore throat, hoarseness, injury to face, mouth, or teeth; nausea; headache; injury to blood vessels or nerves; death, brain damage, or paralysis.     I understand that if I have a Limitation of Treatment order in effect during my hospitalization, the order may or may not be in effect during this procedure. I give my doctor permission to give me blood or blood products. I understand that there are risks with receiving blood such as hepatitis, AIDS, fever, or allergic reaction. I acknowledge that the risks, benefits, and alternatives of this treatment have been explained to me and that no express or implied warranty has been given by the hospital, any blood bank, or any person or entity as to the blood or blood components transfused. At the discretion of my doctor, I agree to allow observers, equipment/product representatives and allow photographing, and/or televising of the procedure, provided my name or identity is maintained confidentially. I agree the hospital may dispose of or use for scientific or educational purposes any tissue, fluid, or body parts which may be removed.     ________________________________Date________Time______ am/pm  ([facility])  Patient or Signature of Closest Relative or Legal Guardian    ________________________________Date________Time______am/pm      Page 1 of  1  Witness

## 2021-07-12 NOTE — PROGRESS NOTES
[address] [facility] patients having surgery or anesthesia are required to be Covid tested OR to have been vaccinated at least 14 days prior to your procedure. It is very important to return our call to [phone] and notify the staff of your last vaccination date otherwise you will be required to complete Covid PCR test within the 5-6 days prior to surgery & quarantine. The results will need to be faxed to PreAdmission Testing at [phone]. PRIOR TO PROCEDURE DATE:        1. PLEASE FOLLOW ANY  GUIDELINES/ INSTRUCTIONS PRIOR TO YOUR PROCEDURE AS ADVISED BY YOUR SURGEON. 2. Arrange for someone to drive you home and be with you for the first 24 hours after discharge for your safety after your procedure for which you received sedation. Ensure it is someone we can share information with regarding your discharge. 3. You must contact your surgeon for instructions IF:   You are taking any blood thinners, aspirin, anti-inflammatory or vitamin E.   There is a change in your physical condition such as a cold, fever, rash, cuts, sores or any other infection, especially near your surgical site. 4. Do not drink alcohol the day before or day of your procedure. 5. A Pre-op History and Physical for surgery MUST be completed by your Physician or Urgent Care within 30 days of your procedure date. Please bring a copy with you on the day of your procedure and along with any other testing performed. THE DAY OF YOUR PROCEDURE:  1. Follow instructions for ARRIVAL TIME as DIRECTED BY YOUR SURGEON. 2. Enter the MAIN entrance from [address] [place] [address] and follow the signs to the free [email or web address] or [place] parking (offered free of charge 6am-5pm). 3. Enter the Main Entrance of the hospital (do not enter from the lower level of the parking garage). Upon entrance, check in with the  at the main desk on your left. If no one is available at the desk, proceed into the [facility] Waiting Room and go through the door directly into the [facility]. There is a Check-in desk ACROSS from Room 5 (marked with a sign hanging from the ceiling). The phone number for the surgery center is [phone]. 4. Please call [phone] option #2 option #2 if you have not been preregistered yet. On the day of your procedure bring your insurance card and photo ID. You will be registered at your bedside once brought back to your room. 5. DO NOT EAT ANYTHING eight hours prior to your arrival for surgery. May have 8 ounces of water 4 hours prior to your arrival for surgery. NOTE: ALL Gastric, Bariatric and Bowel surgery patients MUST follow their surgeon's instructions. 6. MEDICATIONS    Take the following medications with a SMALL sip of water:    Bariatric patient's call surgeon if on diabetic medications as some need to be stopped 1 week preop   Use your usual dose of inhalers the morning of surgery. BRING your rescue inhaler with you to hospital.    Anesthesia does NOT want you to take insulin the morning of surgery. They will control your blood sugar while you are at the hospital. Please contact your ordering physician for instructions regarding your insulin the night before your procedure. If you have an insulin pump, please keep it set on basal rate. 7. Do not swallow water when brushing teeth. No gum, candy, mints or ice chips. Refrain from smoking or at least decrease the amount. 8. Dress in loose, comfortable clothing appropriate for redressing after your procedure. Do not wear jewelry (including body piercings), make-up (especially NO eye make-up), fingernail polish (NO toenail polish if foot/leg surgery), lotion, powders or metal hairclips. 9. Dentures, glasses, or contacts will need to be removed before your procedure.  Bring cases for your glasses, contacts, dentures, or hearing aids to protect them while healthcare worker using a special clippers designed to avoid skin irritation. 5. When you are in the operating room, your surgical site will be cleansed with a special soap, and in most cases, you will be given an antibiotic before the surgery begins. What to expect AFTER YOUR PROCEDURE:  1. Immediately following your procedure, your will be taken to the PACU for the first phase of your recovery. Your nurse will help you recover from any potential side effects of anesthesia, such as extreme drowsiness, changes in your vital signs or breathing patterns. Nausea, headache, muscle aches, or sore throat may also occur after anesthesia. Your nurse will help you manage these potential side effects. 2. For comfort and safety, arrange to have someone at home with you for the first 24 hours after discharge. 3. You and your family will be given written instructions about your diet, activity, dressing care, medications, and return visits. 4. Once at home, should issues with nausea, pain, or bleeding occur, or should you notice any signs of infection, you should call your surgeon. 5. Always clean your hands before and after caring for your wound. Do not let your family touch your surgery site without cleaning their hands. 6. Narcotic pain medications can cause significant constipation. You may want to add a stool softener to your postoperative medication schedule or speak to your surgeon on how best to manage this SIDE EFFECT. SPECIAL INSTRUCTIONS     Thank you for allowing us to care for you. We strive to exceed your expectations in the delivery of care and service provided to you and your family. If you need to contact the [place] [address] staff for any reason, please call us at [phone]    Instructions reviewed with patient during preadmission testing phone interview.   Nolberto Elizabeth RN.7/12/2021 .2:07 PM      ADDITIONAL EDUCATIONAL INFORMATION REVIEWED PER PHONE WITH YOU AND/OR YOUR FAMILY:    Yes Antibacterial Soap

## 2021-07-16 ENCOUNTER — TELEPHONE (OUTPATIENT)
Dept: SURGERY | Age: 26
End: 2021-07-16

## 2021-07-16 ENCOUNTER — ANESTHESIA EVENT (OUTPATIENT)
Dept: OPERATING ROOM | Age: 26
End: 2021-07-16
Payer: COMMERCIAL

## 2021-07-16 NOTE — TELEPHONE ENCOUNTER
Per the hospital, patient's surgery time has been moved to 7:15AM on Monday 7/19/21.     I left the patient a detailed message with surgery instructions and his new arrival time of 5:30AM

## 2021-07-19 ENCOUNTER — ANESTHESIA (OUTPATIENT)
Dept: OPERATING ROOM | Age: 26
End: 2021-07-19
Payer: COMMERCIAL

## 2021-07-19 ENCOUNTER — HOSPITAL ENCOUNTER (OUTPATIENT)
Age: 26
Setting detail: OUTPATIENT SURGERY
Discharge: HOME | End: 2021-07-19
Attending: SURGERY | Admitting: SURGERY
Payer: COMMERCIAL

## 2021-07-19 VITALS
TEMPERATURE: 97.1 F | DIASTOLIC BLOOD PRESSURE: 71 MMHG | SYSTOLIC BLOOD PRESSURE: 109 MMHG | HEIGHT: 72 IN | BODY MASS INDEX: 19.64 KG/M2 | RESPIRATION RATE: 15 BRPM | OXYGEN SATURATION: 96 % | HEART RATE: 78 BPM | WEIGHT: 145 LBS

## 2021-07-19 VITALS — DIASTOLIC BLOOD PRESSURE: 56 MMHG | OXYGEN SATURATION: 99 % | SYSTOLIC BLOOD PRESSURE: 105 MMHG

## 2021-07-19 DIAGNOSIS — K59.4 ANAL SPASM: ICD-10-CM

## 2021-07-19 DIAGNOSIS — G89.18 POST-OP PAIN: Primary | ICD-10-CM

## 2021-07-19 PROCEDURE — 3700000001 HC ADD 15 MINUTES (ANESTHESIA): Performed by: SURGERY

## 2021-07-19 PROCEDURE — 2580000003 HC RX 258: Performed by: NURSE ANESTHETIST, CERTIFIED REGISTERED

## 2021-07-19 PROCEDURE — 46200 REMOVAL OF ANAL FISSURE: CPT | Performed by: SURGERY

## 2021-07-19 PROCEDURE — 3700000000 HC ANESTHESIA ATTENDED CARE: Performed by: SURGERY

## 2021-07-19 PROCEDURE — 3600000003 HC SURGERY LEVEL 3 BASE: Performed by: SURGERY

## 2021-07-19 PROCEDURE — 7100000010 HC PHASE II RECOVERY - FIRST 15 MIN: Performed by: SURGERY

## 2021-07-19 PROCEDURE — 7100000000 HC PACU RECOVERY - FIRST 15 MIN: Performed by: SURGERY

## 2021-07-19 PROCEDURE — 6370000000 HC RX 637 (ALT 250 FOR IP): Performed by: ANESTHESIOLOGY

## 2021-07-19 PROCEDURE — 7100000011 HC PHASE II RECOVERY - ADDTL 15 MIN: Performed by: SURGERY

## 2021-07-19 PROCEDURE — C9290 INJ, BUPIVACAINE LIPOSOME: HCPCS | Performed by: SURGERY

## 2021-07-19 PROCEDURE — 2500000003 HC RX 250 WO HCPCS: Performed by: NURSE ANESTHETIST, CERTIFIED REGISTERED

## 2021-07-19 PROCEDURE — 3600000013 HC SURGERY LEVEL 3 ADDTL 15MIN: Performed by: SURGERY

## 2021-07-19 PROCEDURE — 7100000001 HC PACU RECOVERY - ADDTL 15 MIN: Performed by: SURGERY

## 2021-07-19 PROCEDURE — 2709999900 HC NON-CHARGEABLE SUPPLY: Performed by: SURGERY

## 2021-07-19 PROCEDURE — 2580000003 HC RX 258: Performed by: ANESTHESIOLOGY

## 2021-07-19 PROCEDURE — 6360000002 HC RX W HCPCS: Performed by: ANESTHESIOLOGY

## 2021-07-19 PROCEDURE — 6360000002 HC RX W HCPCS: Performed by: NURSE ANESTHETIST, CERTIFIED REGISTERED

## 2021-07-19 PROCEDURE — 6360000002 HC RX W HCPCS: Performed by: SURGERY

## 2021-07-19 RX ORDER — SODIUM CHLORIDE 9 MG/ML
25 INJECTION, SOLUTION INTRAVENOUS PRN
Status: DISCONTINUED | OUTPATIENT
Start: 2021-07-19 | End: 2021-07-19 | Stop reason: HOSPADM

## 2021-07-19 RX ORDER — SODIUM CHLORIDE, SODIUM LACTATE, POTASSIUM CHLORIDE, CALCIUM CHLORIDE 600; 310; 30; 20 MG/100ML; MG/100ML; MG/100ML; MG/100ML
INJECTION, SOLUTION INTRAVENOUS CONTINUOUS
Status: DISCONTINUED | OUTPATIENT
Start: 2021-07-19 | End: 2021-07-19 | Stop reason: HOSPADM

## 2021-07-19 RX ORDER — DIAZEPAM 5 MG/1
5 TABLET ORAL EVERY 8 HOURS PRN
Qty: 30 TABLET | Refills: 0 | Status: SHIPPED | OUTPATIENT
Start: 2021-07-19 | End: 2021-07-19 | Stop reason: HOSPADM

## 2021-07-19 RX ORDER — SODIUM CHLORIDE 0.9 % (FLUSH) 0.9 %
10 SYRINGE (ML) INJECTION EVERY 12 HOURS SCHEDULED
Status: DISCONTINUED | OUTPATIENT
Start: 2021-07-19 | End: 2021-07-19 | Stop reason: HOSPADM

## 2021-07-19 RX ORDER — PROPOFOL 10 MG/ML
INJECTION, EMULSION INTRAVENOUS PRN
Status: DISCONTINUED | OUTPATIENT
Start: 2021-07-19 | End: 2021-07-19 | Stop reason: SDUPTHER

## 2021-07-19 RX ORDER — SODIUM CHLORIDE, SODIUM LACTATE, POTASSIUM CHLORIDE, CALCIUM CHLORIDE 600; 310; 30; 20 MG/100ML; MG/100ML; MG/100ML; MG/100ML
INJECTION, SOLUTION INTRAVENOUS CONTINUOUS PRN
Status: DISCONTINUED | OUTPATIENT
Start: 2021-07-19 | End: 2021-07-19 | Stop reason: SDUPTHER

## 2021-07-19 RX ORDER — SODIUM CHLORIDE 0.9 % (FLUSH) 0.9 %
10 SYRINGE (ML) INJECTION PRN
Status: DISCONTINUED | OUTPATIENT
Start: 2021-07-19 | End: 2021-07-19 | Stop reason: HOSPADM

## 2021-07-19 RX ORDER — ROCURONIUM BROMIDE 10 MG/ML
INJECTION, SOLUTION INTRAVENOUS PRN
Status: DISCONTINUED | OUTPATIENT
Start: 2021-07-19 | End: 2021-07-19 | Stop reason: SDUPTHER

## 2021-07-19 RX ORDER — HYDRALAZINE HYDROCHLORIDE 20 MG/ML
5 INJECTION INTRAMUSCULAR; INTRAVENOUS EVERY 5 MIN PRN
Status: DISCONTINUED | OUTPATIENT
Start: 2021-07-19 | End: 2021-07-19 | Stop reason: HOSPADM

## 2021-07-19 RX ORDER — OXYCODONE HYDROCHLORIDE AND ACETAMINOPHEN 5; 325 MG/1; MG/1
1 TABLET ORAL EVERY 6 HOURS PRN
Qty: 28 TABLET | Refills: 0 | Status: SHIPPED | OUTPATIENT
Start: 2021-07-19 | End: 2021-07-19 | Stop reason: SDUPTHER

## 2021-07-19 RX ORDER — LIDOCAINE HCL/PF 100 MG/5ML
SYRINGE (ML) INJECTION PRN
Status: DISCONTINUED | OUTPATIENT
Start: 2021-07-19 | End: 2021-07-19 | Stop reason: SDUPTHER

## 2021-07-19 RX ORDER — OXYCODONE HYDROCHLORIDE AND ACETAMINOPHEN 5; 325 MG/1; MG/1
1 TABLET ORAL EVERY 6 HOURS PRN
Qty: 28 TABLET | Refills: 0 | Status: SHIPPED | OUTPATIENT
Start: 2021-07-19 | End: 2021-07-26

## 2021-07-19 RX ORDER — DEXAMETHASONE SODIUM PHOSPHATE 4 MG/ML
INJECTION, SOLUTION INTRA-ARTICULAR; INTRALESIONAL; INTRAMUSCULAR; INTRAVENOUS; SOFT TISSUE PRN
Status: DISCONTINUED | OUTPATIENT
Start: 2021-07-19 | End: 2021-07-19 | Stop reason: SDUPTHER

## 2021-07-19 RX ORDER — FENTANYL CITRATE 50 UG/ML
INJECTION, SOLUTION INTRAMUSCULAR; INTRAVENOUS PRN
Status: DISCONTINUED | OUTPATIENT
Start: 2021-07-19 | End: 2021-07-19 | Stop reason: SDUPTHER

## 2021-07-19 RX ORDER — MEPERIDINE HYDROCHLORIDE 25 MG/ML
12.5 INJECTION INTRAMUSCULAR; INTRAVENOUS; SUBCUTANEOUS EVERY 10 MIN PRN
Status: DISCONTINUED | OUTPATIENT
Start: 2021-07-19 | End: 2021-07-19 | Stop reason: HOSPADM

## 2021-07-19 RX ORDER — ONDANSETRON 2 MG/ML
4 INJECTION INTRAMUSCULAR; INTRAVENOUS
Status: DISCONTINUED | OUTPATIENT
Start: 2021-07-19 | End: 2021-07-19 | Stop reason: HOSPADM

## 2021-07-19 RX ORDER — SODIUM CHLORIDE 9 MG/ML
INJECTION, SOLUTION INTRAVENOUS CONTINUOUS PRN
Status: DISCONTINUED | OUTPATIENT
Start: 2021-07-19 | End: 2021-07-19 | Stop reason: SDUPTHER

## 2021-07-19 RX ORDER — DIAZEPAM 2 MG/1
2 TABLET ORAL EVERY 8 HOURS PRN
Qty: 30 TABLET | Refills: 0 | Status: SHIPPED | OUTPATIENT
Start: 2021-07-19 | End: 2021-07-29

## 2021-07-19 RX ORDER — LABETALOL HYDROCHLORIDE 5 MG/ML
5 INJECTION, SOLUTION INTRAVENOUS EVERY 10 MIN PRN
Status: DISCONTINUED | OUTPATIENT
Start: 2021-07-19 | End: 2021-07-19 | Stop reason: HOSPADM

## 2021-07-19 RX ORDER — LIDOCAINE HYDROCHLORIDE 10 MG/ML
1 INJECTION, SOLUTION EPIDURAL; INFILTRATION; INTRACAUDAL; PERINEURAL
Status: DISCONTINUED | OUTPATIENT
Start: 2021-07-19 | End: 2021-07-19 | Stop reason: HOSPADM

## 2021-07-19 RX ORDER — ONDANSETRON 2 MG/ML
INJECTION INTRAMUSCULAR; INTRAVENOUS PRN
Status: DISCONTINUED | OUTPATIENT
Start: 2021-07-19 | End: 2021-07-19 | Stop reason: SDUPTHER

## 2021-07-19 RX ORDER — FENTANYL CITRATE 50 UG/ML
25 INJECTION, SOLUTION INTRAMUSCULAR; INTRAVENOUS EVERY 5 MIN PRN
Status: DISCONTINUED | OUTPATIENT
Start: 2021-07-19 | End: 2021-07-19 | Stop reason: HOSPADM

## 2021-07-19 RX ORDER — FENTANYL CITRATE 50 UG/ML
50 INJECTION, SOLUTION INTRAMUSCULAR; INTRAVENOUS EVERY 5 MIN PRN
Status: DISCONTINUED | OUTPATIENT
Start: 2021-07-19 | End: 2021-07-19 | Stop reason: HOSPADM

## 2021-07-19 RX ORDER — ENALAPRILAT 2.5 MG/2ML
1.25 INJECTION INTRAVENOUS
Status: DISCONTINUED | OUTPATIENT
Start: 2021-07-19 | End: 2021-07-19 | Stop reason: HOSPADM

## 2021-07-19 RX ADMIN — PROPOFOL 200 MG: 10 INJECTION, EMULSION INTRAVENOUS at 07:20

## 2021-07-19 RX ADMIN — SODIUM CHLORIDE: 9 INJECTION, SOLUTION INTRAVENOUS at 07:36

## 2021-07-19 RX ADMIN — DEXAMETHASONE SODIUM PHOSPHATE 8 MG: 4 INJECTION, SOLUTION INTRAMUSCULAR; INTRAVENOUS at 07:20

## 2021-07-19 RX ADMIN — FENTANYL CITRATE 200 MCG: 50 INJECTION, SOLUTION INTRAMUSCULAR; INTRAVENOUS at 07:20

## 2021-07-19 RX ADMIN — Medication 60 MG: at 07:20

## 2021-07-19 RX ADMIN — SODIUM CHLORIDE, SODIUM LACTATE, POTASSIUM CHLORIDE, AND CALCIUM CHLORIDE: .6; .31; .03; .02 INJECTION, SOLUTION INTRAVENOUS at 07:15

## 2021-07-19 RX ADMIN — SUGAMMADEX 200 MG: 100 INJECTION, SOLUTION INTRAVENOUS at 07:53

## 2021-07-19 RX ADMIN — MEPERIDINE HYDROCHLORIDE 12.5 MG: 25 INJECTION INTRAMUSCULAR; INTRAVENOUS; SUBCUTANEOUS at 08:43

## 2021-07-19 RX ADMIN — PROPOFOL 100 MG: 10 INJECTION, EMULSION INTRAVENOUS at 07:46

## 2021-07-19 RX ADMIN — BENZOCAINE AND MENTHOL 1 LOZENGE: 15; 3.6 LOZENGE ORAL at 09:12

## 2021-07-19 RX ADMIN — ONDANSETRON 4 MG: 2 INJECTION INTRAMUSCULAR; INTRAVENOUS at 07:20

## 2021-07-19 RX ADMIN — SODIUM CHLORIDE, POTASSIUM CHLORIDE, SODIUM LACTATE AND CALCIUM CHLORIDE: 600; 310; 30; 20 INJECTION, SOLUTION INTRAVENOUS at 05:53

## 2021-07-19 RX ADMIN — ROCURONIUM BROMIDE 50 MG: 10 INJECTION INTRAVENOUS at 07:20

## 2021-07-19 ASSESSMENT — PULMONARY FUNCTION TESTS
PIF_VALUE: 0
PIF_VALUE: 17
PIF_VALUE: 1
PIF_VALUE: 17
PIF_VALUE: 20
PIF_VALUE: 2
PIF_VALUE: 17
PIF_VALUE: 17
PIF_VALUE: 16
PIF_VALUE: 17
PIF_VALUE: 16
PIF_VALUE: 17
PIF_VALUE: 16
PIF_VALUE: 17
PIF_VALUE: 0
PIF_VALUE: 0
PIF_VALUE: 26
PIF_VALUE: 0
PIF_VALUE: 3
PIF_VALUE: 2
PIF_VALUE: 17
PIF_VALUE: 18
PIF_VALUE: 0
PIF_VALUE: 16
PIF_VALUE: 17
PIF_VALUE: 17
PIF_VALUE: 2
PIF_VALUE: 18
PIF_VALUE: 12
PIF_VALUE: 16
PIF_VALUE: 2
PIF_VALUE: 11
PIF_VALUE: 3
PIF_VALUE: 0
PIF_VALUE: 17
PIF_VALUE: 2
PIF_VALUE: 13
PIF_VALUE: 0
PIF_VALUE: 16
PIF_VALUE: 3
PIF_VALUE: 17

## 2021-07-19 ASSESSMENT — PAIN SCALES - GENERAL
PAINLEVEL_OUTOF10: 0
PAINLEVEL_OUTOF10: 4
PAINLEVEL_OUTOF10: 0
PAINLEVEL_OUTOF10: 0

## 2021-07-19 ASSESSMENT — PAIN - FUNCTIONAL ASSESSMENT: PAIN_FUNCTIONAL_ASSESSMENT: 0-10

## 2021-07-19 ASSESSMENT — PAIN DESCRIPTION - FREQUENCY: FREQUENCY: CONTINUOUS

## 2021-07-19 ASSESSMENT — PAIN DESCRIPTION - DESCRIPTORS
DESCRIPTORS: CONSTANT
DESCRIPTORS: ACHING

## 2021-07-19 ASSESSMENT — PAIN DESCRIPTION - PAIN TYPE: TYPE: ACUTE PAIN

## 2021-07-19 ASSESSMENT — PAIN DESCRIPTION - LOCATION: LOCATION: ABDOMEN

## 2021-07-19 NOTE — OP NOTE
space. Internal sphincter raised up and partially incised to the length of the fissure, with cautery. Wound closed with 2-0 chromic. Anoscopy was then performed again, wounds were irrigated, confirming complete hemostasis. 20 cc of Expirel anesthetic was injected for perianal nerve block. The patient tolerated the procedure well, was woken up and brought to the PACU in stable condition. All counts were correct x2 at the end of the procedure. No complications. [place] Morris.  Andres Donohue MD    Electronically signed by Aurelio Walsh MD on 7/19/2021 at 7:48 AM

## 2021-07-19 NOTE — H&P
PRE-OP/PRE-PROCEDURE H&P    Visit Date: 7/19/2021    History:     Natty Arriaga is a 26 y.o. male who presents today for procedure. See my/PCP/oncologist office notes for indications and details. There is no problem list on file for this patient. Current Facility-Administered Medications:     lactated ringers infusion, , Intravenous, Continuous, Melvena Maikel, DO, Last Rate: 125 mL/hr at 07/19/21 0553, New Bag at 07/19/21 0553    0.9 % sodium chloride infusion, 25 mL, Intravenous, PRN, Adam Lopez MD    sodium chloride flush 0.9 % injection 10 mL, 10 mL, Intravenous, 2 times per day, Adam Lopez MD    sodium chloride flush 0.9 % injection 10 mL, 10 mL, Intravenous, PRN, Adam Lopez MD    lactated ringers infusion, , Intravenous, Continuous, Gopal Marcelo MD    sodium chloride flush 0.9 % injection 10 mL, 10 mL, Intravenous, 2 times per day, Gopal Marcelo MD    sodium chloride flush 0.9 % injection 10 mL, 10 mL, Intravenous, PRN, Gopal Marcelo MD    0.9 % sodium chloride infusion, 25 mL, Intravenous, PRN, Gopal Marcelo MD    lidocaine PF 1 % injection 1 mL, 1 mL, Intradermal, Once PRN, Gopal Marcelo MD  Prior to Admission medications    Medication Sig Start Date End Date Taking?  Authorizing Provider   rifaximin (XIFAXAN) 200 MG tablet Take 550 mg by mouth 3 times daily   Yes Historical Provider, MD   FIBER PO Take by mouth   Yes Historical Provider, MD   MULTIPLE VITAMINS-MINERALS ER PO Take 1 tablet by mouth daily    Yes Historical Provider, MD     Allergies   Allergen Reactions    Cephalosporins      Fever      Omeprazole Other (See Comments)     \"brain fog\"    Penicillins Other (See Comments)     High fever     Past Medical History:   Diagnosis Date    Anal fissure      Past Surgical History:   Procedure Laterality Date    ANUS SURGERY N/A 12/15/2020    EXAM UNDER ANESTHESIA, BOTOX INJECTION ANAL FISSURE, FISSURECTOMY, FLEXIBLE SIGMOIDOSCOPY performed by Silvio Guerra MD [place] at [facility], COLON, DIAGNOSTIC      TONSILLECTOMY           Physical Exam:     /71   Pulse 61   Temp 97.2 °F (36.2 °C) (Temporal)   Resp 15   Ht 6' (1.829 m)   Wt 145 lb (65.8 kg)   SpO2 98%   BMI 19.67 kg/m²  Body mass index is 19.67 kg/m². Constitutional: Appears well-developed and well-nourished. Head: Normocephalic, atraumatic. Eyes: No scleral icterus. Vision intact grossly. ENT: Hearing grossly intact. No facial deformity. Neck: Normal range of motion. No tracheal deviation. Cardiovascular: Normal rate. No peripheral edema. Pulmonary/Chest: Effort normal. No respiratory distress. No wheezes. No use of accessory muscles. Musculoskeletal: No gross deformity. Neurological: Alert and oriented to person, place, and time. No gross deficits. Skin: Skin is dry. No rash noted. No pallor. Psychiatric: Normal mood and affect. Behavior normal. Oriented to person, place, and time. Abdomen: soft, NTTP, non distended    Recent labs and imaging reviewed as necessary. Assessment/Plan:       Proceed as planned for EUA, fissurectomy, partial sphincterotomy for anal fissure    Risks/benefits/alternatives of procedure/surgery discussed with patient and any present family members (or appropriate guardian) and understanding verbalized. All questions answered. Patient wishes to proceed.     Electronically signed by Burak Chanel MD on 7/19/2021 at 7:03 AM

## 2021-07-19 NOTE — ANESTHESIA POSTPROCEDURE EVALUATION
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        Anesthesia Post Evaluation    Patient location during evaluation: PACU  Patient participation: complete - patient participated  Level of consciousness: awake  Airway patency: patent  Nausea & Vomiting: no nausea and no vomiting  Complications: no  Cardiovascular status: hemodynamically stable  Respiratory status: acceptable  Hydration status: stable

## 2021-07-19 NOTE — PROGRESS NOTES
Pt arrived asleep no SS or CO pain report received from CRNA of exparel used by surgeon EXAM UNDER ANESTHESIA, FISSURECTOMY, PARTIAL LATERAL INTERNAL SPHINCTEROTOMY  EXAM UNDER ANESTHESIA, FISSURECTOMY, PARTIAL LATERAL INTERNAL SPHINCTEROTOMY

## 2021-07-19 NOTE — PROGRESS NOTES
PACU Transfer to [facility]    Vitals:    07/19/21 0915   BP: 133/75 0914   Pulse: 93   Resp: 15     0914   Temp: 97.8   SpO2: 99%         Intake/Output Summary (Last 24 hours) at 7/19/2021 0925  Last data filed at 7/19/2021 0914  Gross per 24 hour   Intake 1504 ml   Output 5 ml   Net 1499 ml       Pain assessment:  present - adequately treated  Pain Level: 0    Patient transferred to care of YANIQUE RN.    7/19/2021 9:25 AM

## 2021-07-19 NOTE — ANESTHESIA PRE PROCEDURE
Department of Anesthesiology  Preprocedure Note       Name:  Lewis Agarwal   Age:  26 y. o.  :  1995                                          MRN:  [id number]         Date:  2021      Surgeon: Lisa Borja):  Burak Chanel MD    Procedure: Procedure(s):  EXAM UNDER ANESTHESIA, FISSURECTOMY, PARTIAL LATERAL INTERNAL SPHINCTEROTOMY  . Medications prior to admission:   Prior to Admission medications    Medication Sig Start Date End Date Taking? Authorizing Provider   rifaximin (XIFAXAN) 200 MG tablet Take 550 mg by mouth 3 times daily   Yes Historical Provider, MD   FIBER PO Take by mouth   Yes Historical Provider, MD   MULTIPLE VITAMINS-MINERALS ER PO Take 1 tablet by mouth daily    Yes Historical Provider, MD       Current medications:    Current Facility-Administered Medications   Medication Dose Route Frequency Provider Last Rate Last Admin    lactated ringers infusion   Intravenous Continuous Doug Nguyen  mL/hr at 21 0553 New Bag at 21 0553    0.9 % sodium chloride infusion  25 mL Intravenous PRN Burak Chanel MD        sodium chloride flush 0.9 % injection 10 mL  10 mL Intravenous 2 times per day Burak Chanel MD        sodium chloride flush 0.9 % injection 10 mL  10 mL Intravenous PRN Burak Chanel MD        lactated ringers infusion   Intravenous Continuous Holli Bardales MD        sodium chloride flush 0.9 % injection 10 mL  10 mL Intravenous 2 times per day Holli Bardales MD        sodium chloride flush 0.9 % injection 10 mL  10 mL Intravenous PRN Holli Bardales MD        0.9 % sodium chloride infusion  25 mL Intravenous PRN Holli Bardales MD        lidocaine PF 1 % injection 1 mL  1 mL Intradermal Once PRN Holli Bardales MD           Allergies:     Allergies   Allergen Reactions    Cephalosporins      Fever      Omeprazole Other (See Comments)     \"brain fog\"    Penicillins Other (See Comments)     High fever       Problem List:  There is no problem list on file for this patient. Past Medical History:        Diagnosis Date    Anal fissure        Past Surgical History:        Procedure Laterality Date    ANUS SURGERY N/A 12/15/2020    EXAM UNDER ANESTHESIA, BOTOX INJECTION ANAL FISSURE, FISSURECTOMY, FLEXIBLE SIGMOIDOSCOPY performed by Yassine Antonio MD at [address], COLON, DIAGNOSTIC      TONSILLECTOMY         Social History:    Social History     Tobacco Use    Smoking status: Never Smoker    Smokeless tobacco: Never Used   Substance Use Topics    Alcohol use: Never                                Counseling given: Not Answered      Vital Signs (Current):   Vitals:    07/12/21 1403 07/19/21 0540   BP:  119/71   Pulse:  61   Resp:  15   Temp:  97.2 °F (36.2 °C)   TempSrc:  Temporal   SpO2:  98%   Weight: 145 lb (65.8 kg) 145 lb (65.8 kg)   Height: 6' (1.829 m) 6' (1.829 m)                                              BP Readings from Last 3 Encounters:   07/19/21 119/71   06/10/21 (!) 149/87   01/26/21 113/67       NPO Status: Time of last liquid consumption: 0300 (8 oz)                        Time of last solid consumption: 1800                        Date of last liquid consumption: 07/19/21                        Date of last solid food consumption: 07/18/21    BMI:   Wt Readings from Last 3 Encounters:   07/19/21 145 lb (65.8 kg)   01/26/21 145 lb (65.8 kg)   12/15/20 140 lb (63.5 kg)     Body mass index is 19.67 kg/m². CBC: No results found for: WBC, RBC, HGB, HCT, MCV, RDW, PLT    CMP: No results found for: NA, K, CL, CO2, BUN, CREATININE, GFRAA, AGRATIO, LABGLOM, GLUCOSE, PROT, CALCIUM, BILITOT, ALKPHOS, AST, ALT    POC Tests: No results for input(s): POCGLU, POCNA, POCK, POCCL, POCBUN, POCHEMO, POCHCT in the last 72 hours.     Coags: No results found for: PROTIME, INR, APTT    HCG (If Applicable): No results found for: PREGTESTUR, PREGSERUM, HCG, HCGQUANT     ABGs: No results found for: PHART, PO2ART, MYQ1IMZ, AEG2NVD,

## 2021-07-19 NOTE — PROGRESS NOTES
Ambulatory Surgery/Procedure Discharge Note    Vitals:    07/19/21 0937   BP: 109/71   Pulse: 78   Resp: 15   Temp: 97.1 °F (36.2 °C)   SpO2: 96%       In: 1504 [P.O.:360; I.V.:1144]  Out: 5     Restroom use offered before discharge. Yes, pt void per bathroom, assist x1. Pain assessment:  level of pain (1-10, 10 severe)  Pain Level: 0   Pt to SDS post exam under anesthesia, fissurectomy, partial lateral internal sphincterotomy. No drainage noted to gi-pad at this time. Pt denies pain at this time. Pt denies nausea at this time. Pt tolerating PO fluids well at this time. Discharge instructions and written prescriptions given to pt's mother and she states understanding of these instructions. Pt states that he is \" ready to go. \"        Patient discharged to home/self care.  Patient discharged via wheel chair by transporter to waiting family/S.O.       7/19/2021 10:25 AM

## 2021-08-10 ENCOUNTER — OFFICE VISIT (OUTPATIENT)
Dept: SURGERY | Age: 26
End: 2021-08-10

## 2021-08-10 VITALS
HEIGHT: 72 IN | HEART RATE: 63 BPM | BODY MASS INDEX: 19.5 KG/M2 | DIASTOLIC BLOOD PRESSURE: 62 MMHG | WEIGHT: 144 LBS | OXYGEN SATURATION: 97 % | SYSTOLIC BLOOD PRESSURE: 110 MMHG

## 2021-08-10 DIAGNOSIS — K90.9 INTESTINAL MALABSORPTION, UNSPECIFIED TYPE: ICD-10-CM

## 2021-08-10 DIAGNOSIS — K60.2 ANAL FISSURE: Primary | ICD-10-CM

## 2021-08-10 PROCEDURE — 99024 POSTOP FOLLOW-UP VISIT: CPT | Performed by: SURGERY

## 2021-08-10 NOTE — PROGRESS NOTES
[address] [address] [address] [facility]  Dept: [phone]  Dept Fax: [phone]  Loc: [phone]    Visit Date: 8/10/2021    Christopher Rosa is a 26 y.o. male who presents today for: Post-Op Check (fissurectomy )      Subjective:     Christopher Rosa is a 26 y.o. male here for postoperative visit after fissurectomy, partial lateral internal sphincterotomy about 3 weeks ago. Overall feels like things are healing. Minimal pain and discomfort. Minimal bleeding. Patient's problem list, medications, past medical, surgical, family, and social histories were reviewed and updated in the chart as indicated today. Objective:     /62 (Site: Left Upper Arm)   Pulse 63   Ht 6' (1.829 m)   Wt 144 lb (65.3 kg)   SpO2 97%   BMI 19.53 kg/m²     Abdominal/wound: Sphincterotomy site healing nicely, fissure almost healed at this point    Assessment/Plan:       ASSESSMENT/PLAN:    3-week status post fissurectomy, partial lateral internal sphincterotomy for anal fissure. Overall seems to be healing nicely. Discussed timeline of at least 3-4 more weeks before complete healing. Overall seems content. I did go ahead and give him the name and number for a GI physician to evaluate him for celiac, which he is suspicious that he has. DISPOSITION:  To GI; f/u with me PRN    Note completed using dictation software, please excuse any errors. Referring/primary care physician updated through [facility] note if PCP was listed.     Electronically signed by Alvaro Torres MD on 8/10/2021 at 12:44 PM

## 2021-10-27 ENCOUNTER — TELEPHONE (OUTPATIENT)
Dept: SURGERY | Age: 26
End: 2021-10-27

## 2021-10-27 ENCOUNTER — OFFICE VISIT (OUTPATIENT)
Dept: SURGERY | Age: 26
End: 2021-10-27

## 2021-10-27 VITALS
SYSTOLIC BLOOD PRESSURE: 124 MMHG | HEIGHT: 72 IN | DIASTOLIC BLOOD PRESSURE: 83 MMHG | OXYGEN SATURATION: 96 % | TEMPERATURE: 98.8 F | WEIGHT: 145 LBS | BODY MASS INDEX: 19.64 KG/M2 | HEART RATE: 79 BPM

## 2021-10-27 DIAGNOSIS — K60.2 ANAL FISSURE: Primary | ICD-10-CM

## 2021-10-27 PROCEDURE — 99024 POSTOP FOLLOW-UP VISIT: CPT | Performed by: SURGERY

## 2021-10-27 NOTE — TELEPHONE ENCOUNTER
Prescription called in to [address] [place] [address] [phone] for nifedipine 0.3%, lidocaine 5%. Instructed to use BID.

## 2021-10-27 NOTE — PROGRESS NOTES
[address] [address] [address] [facility] [address]  Dept: [phone]  Dept Fax: [phone]  Loc: [phone]    Visit Date: 10/27/2021    Paddy Serrato is a 23 y.o. male who presents today for: Post-Op Check (possible abscess )      Subjective:     Paddy Serrato is a 23 y.o. male here for postoperative visit after partial lateral internal sphincterotomy for chronic anal fissure about 6 weeks ago. Still having some discomfort around perineal area. Patient's problem list, medications, past medical, surgical, family, and social histories were reviewed and updated in the chart as indicated today. Objective:     /83   Pulse 79   Temp 98.8 °F (37.1 °C) (Oral)   Ht 6' (1.829 m)   Wt 145 lb (65.8 kg)   SpO2 96%   BMI 19.67 kg/m²     Abdominal/wound: Fissure actually appears to be healing well, but his sphincterotomy skin site is still open and draining a little bit    Assessment/Plan:       ASSESSMENT/PLAN:    Discussed continuing with local conservative measures for now and reassess in 1 month. If not improving, may need EUA, redebridement of his sphincterotomy wound site. DISPOSITION:  F/u 1 month    Note completed using dictation software, please excuse any errors. Referring/primary care physician updated through [facility] note if PCP was listed.     Electronically signed by Sofia Villalobos MD on 10/27/2021 at 3:23 PM

## 2022-03-22 ENCOUNTER — TELEPHONE (OUTPATIENT)
Dept: SURGERY | Age: 27
End: 2022-03-22

## 2022-03-22 ENCOUNTER — OFFICE VISIT (OUTPATIENT)
Dept: SURGERY | Age: 27
End: 2022-03-22
Payer: COMMERCIAL

## 2022-03-22 VITALS
DIASTOLIC BLOOD PRESSURE: 71 MMHG | TEMPERATURE: 97.7 F | OXYGEN SATURATION: 96 % | HEIGHT: 72 IN | BODY MASS INDEX: 20.86 KG/M2 | HEART RATE: 77 BPM | WEIGHT: 154 LBS | SYSTOLIC BLOOD PRESSURE: 114 MMHG

## 2022-03-22 DIAGNOSIS — K60.2 ANAL FISSURE: Primary | ICD-10-CM

## 2022-03-22 DIAGNOSIS — K62.89 ANAL IRRITATION: ICD-10-CM

## 2022-03-22 PROCEDURE — 99213 OFFICE O/P EST LOW 20 MIN: CPT | Performed by: SURGERY

## 2022-03-22 NOTE — PROGRESS NOTES
[address] [facility] COLORECTAL SURGERY  [address] [address] [facility] [address]  Dept: [phone]  Dept Fax: [phone]  Loc: [phone]    Visit Date: 3/22/2022    Ravi Sanders is a 23 y.o. male who presents today for: Follow-up (Recurrent fissure )      HPI:       Ravi Snaders is a 23 y.o. male who is well-known to me after previous anal fissurectomy with Botox injection in 2020, and then a partial lateral internal sphincterotomy in 2021, in July. He tells me that even since surgery he has had issues where he feels like it has not completely healed. He still has some anal irritation.     Past Medical History:   Diagnosis Date    Anal fissure      Past Surgical History:   Procedure Laterality Date    ANUS SURGERY N/A 12/15/2020    EXAM UNDER ANESTHESIA, BOTOX INJECTION ANAL FISSURE, FISSURECTOMY, FLEXIBLE SIGMOIDOSCOPY performed by Yovanny Freedman MD at [address] [place] [address],[address] N/A 7/19/2021    EXAM UNDER ANESTHESIA, FISSURECTOMY, PARTIAL LATERAL INTERNAL SPHINCTEROTOMY performed by Yovanny Freedman MD at [address] [place] [address], DIAGNOSTIC      TONSILLECTOMY         Current Outpatient Medications:     FIBER PO, Take by mouth, Disp: , Rfl:     MULTIPLE VITAMINS-MINERALS ER PO, Take 1 tablet by mouth daily , Disp: , Rfl:   Allergies   Allergen Reactions    Cephalosporins      Fever      Omeprazole Other (See Comments)     \"brain fog\"    Penicillins Other (See Comments)     High fever     Past Surgical History:   Procedure Laterality Date    ANUS SURGERY N/A 12/15/2020    EXAM UNDER ANESTHESIA, BOTOX INJECTION ANAL FISSURE, FISSURECTOMY, FLEXIBLE SIGMOIDOSCOPY performed by Yovanny Freedman MD at [facility] 7/19/2021    EXAM UNDER ANESTHESIA, FISSURECTOMY, PARTIAL LATERAL INTERNAL SPHINCTEROTOMY performed by Yovanny Freedman MD at [address], DIAGNOSTIC      TONSILLECTOMY       No family history on file. Social History:   Social History     Tobacco Use    Smoking status: Never Smoker    Smokeless tobacco: Never Used   Substance Use Topics    Alcohol use: Never      Tobacco cessation counseling provided as appropriate. REVIEW OF SYSTEMS:    Pertinent positives and negatives are mentioned in the HPI. Otherwise, all other systems were reviewed and negative. Objective:     Physical Exam   /71   Pulse 77   Temp 97.7 °F (36.5 °C) (Infrared)   Ht 6' (1.829 m)   Wt 154 lb (69.9 kg)   SpO2 96%   BMI 20.89 kg/m²   Constitutional: Appears well-developed and well-nourished. Grooming appropriate. No gross deformities. Body mass index is 20.89 kg/m². Eyes: No scleral icterus. Conjunctiva/lids normal. Vision intact grossly. Pupils equal/symmetric, reactive bilaterally. ENT: External ears/nose without defect, scars, or masses. Hearing grossly intact. No facial deformity. Lips normal, normal dentition. Neck: No masses. Trachea midline. No crepitus. Thyroid not enlarged. Cardiovascular: Normal rate. No peripheral edema. Abdominal aorta normal size to palpation. Pulmonary/Chest: Effort normal. No respiratory distress. No wheezes. No use of accessory muscles. Musculoskeletal: Normal range of motion of head/neck, without deformity, pain, or crepitus, with normal strength and tone. Normal gait. Nails without clubbing or cyanosis. Neurological: Alert and oriented to person, place, and time. No gross deficits. Sensation intact. Skin: Skin is dry. No rashes noted. No pallor. No induration of nodules. Psychiatric: Normal mood and affect. Behavior normal. Oriented to person, place, and time. Judgment and insight reasonable. Abdominal/wound: Soft, nontender, nondistended    Anorectal exam chaperone in room. Patient placed left lower position. Buttock spread. Scar noted in left lateral perianal skin from the sphincterotomy site. Anterior midline small defect noted with anoscopy. Nontender. Labs reviewed: None     Imaging reviewed: None    Assessment/Plan:       A/P:  Established problem(s): Continued anal irritation after partial lateral internal sphincterotomy  Additional workup/treatment planned: Continue with conservative/OTC management, or calcium channel blocker prescription ointment  Risk of complications/morbidity: Low    Unfortunately, Ghazal Aleman continues to have anal irritation. He asked several questions about his partial lateral internal sphincterotomy site, but on exam I note a small scar and nothing else. He does still have a very small mild defect in the anterior midline, likely the anal fissure site. At this point I cannot say for sure that he has an actual anal fissure recurrence versus just an unhealed fissurectomy site. Discussed that a repeat sphincterotomy would be higher risk for incontinence so I would trial calcium channel blocker prescription ointment again. I also offered a second opinion, especially if we are considering taking him back to surgery. Continue with current prescription medication. DISPOSITION:  F/u PRN    My findings will be relayed to consulting practitioner or PCP via Epic note    Note completed using dictation software, please excuse any errors.     Electronically signed by Edgar Estrada MD on 3/22/2022 at 4:57 PM

## 2022-03-22 NOTE — TELEPHONE ENCOUNTER
Prescription called in to [facility] [phone], for nifedipine 0.3%, lidocaine 5%. Instructed to use BID.  2 refills

## 2023-03-03 ENCOUNTER — TELEPHONE (OUTPATIENT)
Dept: SURGERY | Age: 28
End: 2023-03-03

## 2023-03-03 NOTE — TELEPHONE ENCOUNTER
Pt requesting for compound nifedipine 0.3%, lidocaine 5% ointment to be refilled. He stated that pharmacy has actual medication, but he waited too long and the rx needs reauthorized. Please send to the [facility] pharmacy/[facility] ([phone])    Thanks!

## (undated) DEVICE — UNDERPANTS INCONT XL 45-70IN KNIT SEAMLESS DSGN COLOR-CODED

## (undated) DEVICE — DRAPE,UNDERBUTTOCKS,PCH,STERILE: Brand: MEDLINE

## (undated) DEVICE — SURE SET-DOUBLE BASIN-LF: Brand: MEDLINE INDUSTRIES, INC.

## (undated) DEVICE — PENCIL ES ULT VAC W TELSCP NOSE EZ CLN BLDE 10FT

## (undated) DEVICE — COVER LT HNDL BLU PLAS

## (undated) DEVICE — GARMENT,MEDLINE,DVT,INT,CALF,MED, GEN2: Brand: MEDLINE

## (undated) DEVICE — JEWISH HOSPITAL TURNOVER KIT: Brand: MEDLINE INDUSTRIES, INC.

## (undated) DEVICE — POSITIONER HD REST FOAM CMFRT TCH

## (undated) DEVICE — TRAY PREP DRY W/ PREM GLV 2 APPL 6 SPNG 2 UNDPD 1 OVERWRAP

## (undated) DEVICE — GLOVE SURG SZ 7 CRM LTX FREE POLYISOPRENE POLYMER BEAD ANTI

## (undated) DEVICE — PLATE ES AD W 9FT CRD 2

## (undated) DEVICE — SHEET,T,THYROID,STERILE: Brand: MEDLINE

## (undated) DEVICE — ST FLUFF LG 1 PLY: Brand: DEROYAL

## (undated) DEVICE — PAD,ABDOMINAL,5"X9",ST,LF,25/BX: Brand: MEDLINE INDUSTRIES, INC.

## (undated) DEVICE — STANDARD HYPODERMIC NEEDLE,POLYPROPYLENE HUB: Brand: MONOJECT

## (undated) DEVICE — SURGICAL SET UP - SURE SET: Brand: MEDLINE INDUSTRIES, INC.

## (undated) DEVICE — PAD,NON-ADHERENT,3X8,STERILE,LF,1/PK: Brand: MEDLINE